# Patient Record
Sex: MALE | Race: WHITE | Employment: UNEMPLOYED | URBAN - NONMETROPOLITAN AREA
[De-identification: names, ages, dates, MRNs, and addresses within clinical notes are randomized per-mention and may not be internally consistent; named-entity substitution may affect disease eponyms.]

---

## 2017-08-24 ENCOUNTER — HOSPITAL ENCOUNTER (EMERGENCY)
Age: 52
Discharge: HOME | End: 2017-08-24
Attending: EMERGENCY MEDICINE
Payer: MEDICAID

## 2017-08-24 ENCOUNTER — APPOINTMENT (OUTPATIENT)
Dept: GENERAL RADIOLOGY | Age: 52
End: 2017-08-24
Payer: MEDICAID

## 2017-08-24 VITALS
TEMPERATURE: 96.8 F | HEART RATE: 105 BPM | HEIGHT: 70 IN | RESPIRATION RATE: 12 BRPM | DIASTOLIC BLOOD PRESSURE: 79 MMHG | SYSTOLIC BLOOD PRESSURE: 111 MMHG | BODY MASS INDEX: 31.5 KG/M2 | OXYGEN SATURATION: 97 % | WEIGHT: 220 LBS

## 2017-08-24 DIAGNOSIS — S90.31XA CONTUSION OF RIGHT FOOT, INITIAL ENCOUNTER: Primary | ICD-10-CM

## 2017-08-24 PROCEDURE — 99283 EMERGENCY DEPT VISIT LOW MDM: CPT

## 2017-08-24 PROCEDURE — 73630 X-RAY EXAM OF FOOT: CPT

## 2017-08-24 RX ORDER — IBUPROFEN 800 MG/1
800 TABLET ORAL EVERY 8 HOURS PRN
Qty: 30 TABLET | Refills: 0 | Status: SHIPPED | OUTPATIENT
Start: 2017-08-24

## 2017-08-24 RX ORDER — ALBUTEROL SULFATE 2.5 MG/3ML
2.5 SOLUTION RESPIRATORY (INHALATION) ONCE
Status: DISCONTINUED | OUTPATIENT
Start: 2017-08-24 | End: 2017-08-24

## 2017-08-24 RX ORDER — LOVASTATIN 10 MG/1
1 TABLET ORAL DAILY
COMMUNITY

## 2017-08-24 RX ORDER — LISINOPRIL 20 MG/1
20 TABLET ORAL DAILY
COMMUNITY

## 2017-08-24 RX ORDER — TRAZODONE HYDROCHLORIDE 150 MG/1
150 TABLET ORAL NIGHTLY
COMMUNITY

## 2017-08-24 ASSESSMENT — PAIN DESCRIPTION - LOCATION: LOCATION: FOOT

## 2017-08-24 ASSESSMENT — PAIN DESCRIPTION - ORIENTATION: ORIENTATION: RIGHT

## 2017-08-24 ASSESSMENT — PAIN DESCRIPTION - PAIN TYPE: TYPE: ACUTE PAIN

## 2017-08-24 ASSESSMENT — PAIN DESCRIPTION - DESCRIPTORS: DESCRIPTORS: ACHING;SHOOTING;JABBING

## 2017-08-24 ASSESSMENT — PAIN SCALES - GENERAL: PAINLEVEL_OUTOF10: 8

## 2017-10-17 ENCOUNTER — APPOINTMENT (OUTPATIENT)
Dept: GENERAL RADIOLOGY | Age: 52
End: 2017-10-17
Payer: MEDICAID

## 2017-10-17 ENCOUNTER — HOSPITAL ENCOUNTER (EMERGENCY)
Age: 52
Discharge: ANOTHER HOSPITAL | End: 2017-10-17
Attending: EMERGENCY MEDICINE
Payer: MEDICAID

## 2017-10-17 VITALS
HEIGHT: 70 IN | DIASTOLIC BLOOD PRESSURE: 79 MMHG | OXYGEN SATURATION: 99 % | HEART RATE: 94 BPM | TEMPERATURE: 96.8 F | WEIGHT: 220 LBS | SYSTOLIC BLOOD PRESSURE: 135 MMHG | BODY MASS INDEX: 31.5 KG/M2 | RESPIRATION RATE: 14 BRPM

## 2017-10-17 LAB
ABSOLUTE EOS #: 0.1 K/UL (ref 0–0.4)
ABSOLUTE LYMPH #: 2.6 K/UL (ref 1–4.8)
ABSOLUTE MONO #: 0.8 K/UL (ref 0.1–1.2)
ANION GAP SERPL CALCULATED.3IONS-SCNC: 14 MMOL/L (ref 9–17)
BASOPHILS # BLD: 1 % (ref 0–2)
BASOPHILS ABSOLUTE: 0.1 K/UL (ref 0–0.2)
BUN BLDV-MCNC: 13 MG/DL (ref 6–20)
BUN/CREAT BLD: 20 (ref 9–20)
CALCIUM SERPL-MCNC: 9.3 MG/DL (ref 8.6–10.4)
CHLORIDE BLD-SCNC: 96 MMOL/L (ref 98–107)
CO2: 26 MMOL/L (ref 20–31)
CREAT SERPL-MCNC: 0.65 MG/DL (ref 0.7–1.2)
DIFFERENTIAL TYPE: NORMAL
EOSINOPHILS RELATIVE PERCENT: 2 % (ref 1–8)
GFR AFRICAN AMERICAN: >60 ML/MIN
GFR NON-AFRICAN AMERICAN: >60 ML/MIN
GFR SERPL CREATININE-BSD FRML MDRD: ABNORMAL ML/MIN/{1.73_M2}
GFR SERPL CREATININE-BSD FRML MDRD: ABNORMAL ML/MIN/{1.73_M2}
GLUCOSE BLD-MCNC: 218 MG/DL (ref 70–99)
HCT VFR BLD CALC: 41.4 % (ref 41–53)
HEMOGLOBIN: 13.6 G/DL (ref 13.5–17.5)
LYMPHOCYTES # BLD: 33 % (ref 15–43)
MCH RBC QN AUTO: 30.3 PG (ref 26–34)
MCHC RBC AUTO-ENTMCNC: 32.9 G/DL (ref 31–37)
MCV RBC AUTO: 91.9 FL (ref 80–100)
MONOCYTES # BLD: 10 % (ref 6–14)
PDW BLD-RTO: 13.1 % (ref 11–14.5)
PLATELET # BLD: 288 K/UL (ref 140–450)
PLATELET ESTIMATE: NORMAL
PMV BLD AUTO: 7.1 FL (ref 6–12)
POTASSIUM SERPL-SCNC: 4.9 MMOL/L (ref 3.7–5.3)
RBC # BLD: 4.51 M/UL (ref 4.5–5.9)
RBC # BLD: NORMAL 10*6/UL
SEG NEUTROPHILS: 54 % (ref 44–74)
SEGMENTED NEUTROPHILS ABSOLUTE COUNT: 4.3 K/UL (ref 1.8–7.7)
SODIUM BLD-SCNC: 136 MMOL/L (ref 135–144)
WBC # BLD: 7.9 K/UL (ref 3.5–11)
WBC # BLD: NORMAL 10*3/UL

## 2017-10-17 PROCEDURE — 80048 BASIC METABOLIC PNL TOTAL CA: CPT

## 2017-10-17 PROCEDURE — 36415 COLL VENOUS BLD VENIPUNCTURE: CPT

## 2017-10-17 PROCEDURE — 85025 COMPLETE CBC W/AUTO DIFF WBC: CPT

## 2017-10-17 PROCEDURE — 73630 X-RAY EXAM OF FOOT: CPT

## 2017-10-17 PROCEDURE — 99284 EMERGENCY DEPT VISIT MOD MDM: CPT

## 2017-10-17 RX ORDER — SULFAMETHOXAZOLE AND TRIMETHOPRIM 800; 160 MG/1; MG/1
1 TABLET ORAL 2 TIMES DAILY
COMMUNITY
End: 2018-01-02 | Stop reason: ALTCHOICE

## 2017-10-17 RX ORDER — GABAPENTIN 400 MG/1
400 CAPSULE ORAL EVERY EVENING
COMMUNITY

## 2017-10-17 ASSESSMENT — PAIN DESCRIPTION - LOCATION: LOCATION: FOOT

## 2017-10-17 ASSESSMENT — PAIN DESCRIPTION - PAIN TYPE: TYPE: ACUTE PAIN

## 2017-10-17 ASSESSMENT — PAIN DESCRIPTION - FREQUENCY: FREQUENCY: CONTINUOUS

## 2017-10-17 ASSESSMENT — PAIN DESCRIPTION - DESCRIPTORS: DESCRIPTORS: SHARP;JABBING

## 2017-10-17 ASSESSMENT — PAIN DESCRIPTION - ORIENTATION: ORIENTATION: RIGHT

## 2017-10-17 ASSESSMENT — PAIN SCALES - GENERAL: PAINLEVEL_OUTOF10: 6

## 2017-10-17 NOTE — ED PROVIDER NOTES
eMERGENCY dEPARTMENT eNCOUnter      Pt Name: Meliza Cline  MRN: [id number]  [place] 1965  Date of evaluation: 10/17/2017      CHIEF COMPLAINT       Chief Complaint   Patient presents with    Wound Infection         HISTORY OF PRESENT ILLNESS    Meliza Cline is a 46 y.o. male who presents With wound infection. Patient states he had a amputation secondary to diabetic toe back in beginning of September. His last visit to his orthopedist was on September 21. He ended up with a wound dehiscence one week after surgery. He has not followed up since that time he was at [facility] 3 days ago. Was diagnosed with an infection started on antibiotics. He is here because of increasing pain throughout this time. Dark coloration and odor. No fever         REVIEW OF SYSTEMS       Positive pain swelling, redness, odor, right foot, negative for fever, chills     PAST MEDICAL HISTORY    has a past medical history of CAD (coronary artery disease); Depression; Diabetes mellitus ([address] Hyperlipidemia; and Hypertension. SURGICAL HISTORY      has a past surgical history that includes Spinal fusion and Toe amputation (Right, 09/04/2017). CURRENT MEDICATIONS       Previous Medications    GABAPENTIN (NEURONTIN) 400 MG CAPSULE    Take 400 mg by mouth 3 times daily    IBUPROFEN (ADVIL;MOTRIN) 800 MG TABLET    Take 1 tablet by mouth every 8 hours as needed for Pain    LISINOPRIL (PRINIVIL;ZESTRIL) 20 MG TABLET    Take 20 mg by mouth daily    LOVASTATIN PO    Take by mouth    METFORMIN HCL PO    Take by mouth    SULFAMETHOXAZOLE-TRIMETHOPRIM (BACTRIM DS;SEPTRA DS) 800-160 MG PER TABLET    Take 1 tablet by mouth 2 times daily    TRAZODONE (DESYREL) 150 MG TABLET    Take 150 mg by mouth nightly       ALLERGIES     is allergic to pcn [penicillins]. FAMILY HISTORY     has no family status information on file. family history is not on file.     SOCIAL HISTORY      reports that he has been smoking Cigarettes. He has been smoking about 1.00 pack per day. He does not have any smokeless tobacco history on file. He reports that he drinks alcohol. He reports that he uses drugs. PHYSICAL EXAM     INITIAL VITALS:  height is 5' 10\" (1.778 m) and weight is 220 lb (99.8 kg). His tympanic temperature is 96.8 °F (36 °C). His blood pressure is 135/79 and his pulse is 94. His respiration is 14 and oxygen saturation is 99%. Gen.: Patient is a middle-aged male in no apparent distress. Extremity: Examination right lower extremity reveals erythema, warmth. He has black hard tissue to the wound. File odor to the wound and some drainage    DIFFERENTIAL DIAGNOSIS/ MDM:     Postop wound infection, gangrene    DIAGNOSTIC RESULTS       RADIOLOGY:   I directly visualized the following  images and reviewed the radiologist interpretations:  XR FOOT RIGHT (MIN 3 VIEWS)   Preliminary Result   Interval 5th metatarsal trans amputation. Overlying soft tissue swelling   appears greater than expected given date of surgery. Underlying infection is   not excluded. Down definite erosive changes are seen to suggest osteomyelitis. LABS:  Labs Reviewed   BASIC METABOLIC PANEL - Abnormal; Notable for the following:        Result Value    Glucose 218 (*)     CREATININE 0.65 (*)     Chloride 96 (*)     All other components within normal limits   CBC WITH AUTO DIFFERENTIAL         EMERGENCY DEPARTMENT COURSE:   Vitals:    Vitals:    10/17/17 0546 10/17/17 0702   BP: (!) 168/76 135/79   Pulse: 105 94   Resp: 14 14   Temp: 96.8 °F (36 °C)    TempSrc: Tympanic    SpO2: 98% 99%   Weight: 220 lb (99.8 kg)    Height: 5' 10\" (1.778 m)      -------------------------  BP: 135/79, Temp: 96.8 °F (36 °C), Pulse: 94, Resp: 14    No orders of the defined types were placed in this encounter.           Re-evaluation Notes    I did contact his orthopedist who recommended him being admitted to [facility] under

## 2017-12-28 ENCOUNTER — TELEPHONE (OUTPATIENT)
Dept: WOUND CARE | Age: 52
End: 2017-12-28

## 2017-12-28 NOTE — TELEPHONE ENCOUNTER
1) Call to [facility], spoke with Kirstie Macias LPN, request for medical records. Kirstie Macias states that pt has no home address, and is homeless when not in living facility. Pt did have R foot amputation in September 2017 (see Care Everywhere-R 5th toe Ray amputation 9/4/17 Dr. John Villalobos). Kirstie Macias [facility] but [facility] is refusing services from [facility]. 2) Call to Dr. Reyes Pu office ([phone]),GUNDW with Ashly Griffiths, who states multiple attempts have been made to establish care relationship with pt, but appts have been cancelled by pt. Request for medical records; Ashly Griffiths states she will fax what they have, but is very limited.

## 2017-12-29 ENCOUNTER — TELEPHONE (OUTPATIENT)
Dept: WOUND CARE | Age: 52
End: 2017-12-29

## 2018-01-02 ENCOUNTER — OFFICE VISIT (OUTPATIENT)
Dept: WOUND CARE | Age: 53
End: 2018-01-02
Payer: MEDICAID

## 2018-01-02 VITALS
WEIGHT: 229 LBS | SYSTOLIC BLOOD PRESSURE: 108 MMHG | HEART RATE: 68 BPM | DIASTOLIC BLOOD PRESSURE: 74 MMHG | BODY MASS INDEX: 32.78 KG/M2 | HEIGHT: 70 IN | TEMPERATURE: 97.8 F

## 2018-01-02 DIAGNOSIS — E11.42 DM TYPE 2 WITH DIABETIC PERIPHERAL NEUROPATHY (HCC): ICD-10-CM

## 2018-01-02 DIAGNOSIS — Z89.431 STATUS POST PARTIAL AMPUTATION OF FOOT, RIGHT (HCC): ICD-10-CM

## 2018-01-02 DIAGNOSIS — L97.512 SKIN ULCER OF RIGHT FOOT WITH FAT LAYER EXPOSED (HCC): Primary | ICD-10-CM

## 2018-01-02 PROCEDURE — 99202 OFFICE O/P NEW SF 15 MIN: CPT | Performed by: PODIATRIST

## 2018-01-02 PROCEDURE — 11042 DBRDMT SUBQ TIS 1ST 20SQCM/<: CPT | Performed by: PODIATRIST

## 2018-01-02 RX ORDER — GLYBURIDE 2.5 MG/1
5 TABLET ORAL
COMMUNITY

## 2018-01-02 RX ORDER — CEFTRIAXONE 1 G/1
2000 INJECTION, POWDER, FOR SOLUTION INTRAMUSCULAR; INTRAVENOUS
COMMUNITY
Start: 2017-12-26 | End: 2018-01-05

## 2018-01-02 NOTE — PROGRESS NOTES
Vitals:   Vitals:    01/02/18 0911   BP: 108/74   Pulse: 68   Temp: 97.8 °F (36.6 °C)     General: AAO x 3 in NAD. Vascular: DP and PT pulses palpable 1/4, bilateral.  CFT <5 seconds, bilateral.  Hair growth absent to the level of the digits, bilateral.  Edema present, bilateral.  Varicosities present, bilateral. Erythema absent, bilateral. Distal Rubor absent bilateral.  Temperature decreased bilateral. Hyperpigmentation present bilateral. Atrophic skin yes. Neurological: Sensation Impaired to light touch to level of digits, bilateral.  Protective sensation intact  5/10 sites via 5.07/10g [place]-Diana Monofilament, bilateral.  negative Tinel's, bilateral.  negative Valleix sign, bilateral.  Vibratory abnormal  bilateral.  Reflexes Decreased bilateral.  Paresthesias positive. Dysthesias negative. Sharp/dull abnormal bilateral.    Musculoskeletal: Muscle strength 5/5, bilateral.  Pain absent upon palpation bilateral. Normal medial longitudinal arch, bilateral.  Ankle ROM within normal limits,bilateral.  1st MPJ ROM within normal limits, bilateral.  Dorsally contracted digits absent. Partial R 5th ray amp. No other foot deformities. Integument:   Open lesion present, Right. Ulcer lateral R foot from midfoot down to distal foot. Positive fibrin, healthy red granular tissue underlying, proximal point is deepest down to sub q tissue, no probing no malodor no signs of infx on edges. Interdigital maceration absent to web spaces , Bilateral.  Nails left none and right  none thickened, dystrophic and crumbly, discolored with subungual debris. Fissures absent, Bilateral. Hyperkeratotic tissue is absent.     Wound 01/02/18 #1 right lateral foot (Active)   Wound Type Wound 1/2/2018  9:28 AM   Wound Cleansed Rinsed/Irrigated with saline 1/2/2018  9:28 AM   Wound Length (cm) 9.2 cm 1/2/2018  9:28 AM   Wound Width (cm) 2.2 cm 1/2/2018  9:28 AM   Wound Depth (cm)  1.2 1/2/2018  9:28 AM   Calculated Wound infection. Follow-up at [facility] in 1 week(s).

## 2018-01-04 ENCOUNTER — TELEPHONE (OUTPATIENT)
Dept: WOUND CARE | Age: 53
End: 2018-01-04

## 2018-01-08 ENCOUNTER — OFFICE VISIT (OUTPATIENT)
Dept: WOUND CARE | Age: 53
End: 2018-01-08
Payer: MEDICAID

## 2018-01-08 VITALS
HEIGHT: 70 IN | BODY MASS INDEX: 33.84 KG/M2 | RESPIRATION RATE: 20 BRPM | DIASTOLIC BLOOD PRESSURE: 70 MMHG | TEMPERATURE: 97.4 F | HEART RATE: 80 BPM | WEIGHT: 236.4 LBS | SYSTOLIC BLOOD PRESSURE: 122 MMHG

## 2018-01-08 DIAGNOSIS — L97.512 SKIN ULCER OF RIGHT FOOT WITH FAT LAYER EXPOSED (HCC): Primary | ICD-10-CM

## 2018-01-08 DIAGNOSIS — E11.42 DM TYPE 2 WITH DIABETIC PERIPHERAL NEUROPATHY (HCC): ICD-10-CM

## 2018-01-08 DIAGNOSIS — Z89.431 STATUS POST PARTIAL AMPUTATION OF FOOT, RIGHT (HCC): ICD-10-CM

## 2018-01-08 PROCEDURE — 97597 DBRDMT OPN WND 1ST 20 CM/<: CPT | Performed by: PODIATRIST

## 2018-01-08 NOTE — PROGRESS NOTES
Subjective:    Gretchen Bhatt is a 46 y.o. male who presents with the ulceration of the foot. Patient has been compliant with off loading. Patient relates pain is Absent . Currently denies F/C/N/V. Overall diabetic control is not changed. Allergies   Allergen Reactions    Pcn [Penicillins] Rash       Past Medical History:   Diagnosis Date    CAD (coronary artery disease)     Depression     Diabetes mellitus ([address]     Hyperlipidemia     Hypertension        Prior to Admission medications    Medication Sig Start Date End Date Taking? Authorizing Provider   glyBURIDE (DIABETA) 2.5 MG tablet Take 2.5 mg by mouth   Yes Historical Provider, MD   gabapentin (NEURONTIN) 400 MG capsule Take 400 mg by mouth every evening . Yes Historical Provider, MD   lovastatin (MEVACOR) 10 MG tablet Take 1 tablet by mouth daily    Yes Historical Provider, MD   traZODone (DESYREL) 150 MG tablet Take 150 mg by mouth nightly   Yes Historical Provider, MD   lisinopril (PRINIVIL;ZESTRIL) 20 MG tablet Take 20 mg by mouth daily   Yes Historical Provider, MD   metFORMIN (GLUCOPHAGE) 500 MG tablet Take 1 tablet by mouth 2 times daily    Yes Historical Provider, MD   ibuprofen (ADVIL;MOTRIN) 800 MG tablet Take 1 tablet by mouth every 8 hours as needed for Pain 8/24/17  Yes Mendoza Weller MD       Social History   Substance Use Topics    Smoking status: Current Every Day Smoker     Packs/day: 1.00     Types: Cigarettes    Smokeless tobacco: Never Used    Alcohol use Yes      Comment: rarely       Review of Systems: All 12 systems reviewed and pertinent positives noted above. Lower Extremity Physical Examination:     Vitals:   Vitals:    01/08/18 1003   BP: 122/70   Pulse: 80   Resp: 20   Temp: 97.4 °F (36.3 °C)     General: AAO x 3 in NAD.      Vascular: DP and PT pulses palpable 1/4, bilateral.  CFT <5 seconds, bilateral.  Hair growth absent to the level of the digits, bilateral.  Edema present, bilateral.  Varicosities present,

## 2018-01-11 ENCOUNTER — TELEPHONE (OUTPATIENT)
Dept: INFECTIOUS DISEASES | Age: 53
End: 2018-01-11

## 2018-01-11 ENCOUNTER — OFFICE VISIT (OUTPATIENT)
Dept: INFECTIOUS DISEASES | Age: 53
End: 2018-01-11
Payer: MEDICAID

## 2018-01-11 VITALS
HEIGHT: 70 IN | DIASTOLIC BLOOD PRESSURE: 71 MMHG | TEMPERATURE: 97 F | WEIGHT: 234 LBS | OXYGEN SATURATION: 94 % | SYSTOLIC BLOOD PRESSURE: 121 MMHG | BODY MASS INDEX: 33.5 KG/M2 | RESPIRATION RATE: 14 BRPM | HEART RATE: 99 BPM

## 2018-01-11 DIAGNOSIS — I73.9 PVD (PERIPHERAL VASCULAR DISEASE) (HCC): Primary | ICD-10-CM

## 2018-01-11 DIAGNOSIS — M86.171 OTHER ACUTE OSTEOMYELITIS OF RIGHT FOOT (HCC): ICD-10-CM

## 2018-01-11 PROBLEM — M86.9 OSTEOMYELITIS OF RIGHT FOOT (HCC): Status: ACTIVE | Noted: 2018-01-11

## 2018-01-11 PROCEDURE — G8484 FLU IMMUNIZE NO ADMIN: HCPCS | Performed by: INTERNAL MEDICINE

## 2018-01-11 PROCEDURE — G8427 DOCREV CUR MEDS BY ELIG CLIN: HCPCS | Performed by: INTERNAL MEDICINE

## 2018-01-11 PROCEDURE — 4004F PT TOBACCO SCREEN RCVD TLK: CPT | Performed by: INTERNAL MEDICINE

## 2018-01-11 PROCEDURE — G8417 CALC BMI ABV UP PARAM F/U: HCPCS | Performed by: INTERNAL MEDICINE

## 2018-01-11 PROCEDURE — 3017F COLORECTAL CA SCREEN DOC REV: CPT | Performed by: INTERNAL MEDICINE

## 2018-01-11 PROCEDURE — 99213 OFFICE O/P EST LOW 20 MIN: CPT | Performed by: INTERNAL MEDICINE

## 2018-01-11 RX ORDER — FLUTICASONE PROPIONATE 50 MCG
1 SPRAY, SUSPENSION (ML) NASAL
COMMUNITY

## 2018-01-11 RX ORDER — ACETAMINOPHEN 325 MG/1
650 TABLET ORAL EVERY 6 HOURS PRN
COMMUNITY

## 2018-01-11 RX ORDER — CEFTRIAXONE 2 G/1
2 INJECTION, POWDER, FOR SOLUTION INTRAMUSCULAR; INTRAVENOUS EVERY 24 HOURS
COMMUNITY
End: 2018-04-09 | Stop reason: ALTCHOICE

## 2018-01-12 NOTE — TELEPHONE ENCOUNTER
I called [facility] and spoke with Mariaelena Jose. I asked her to fax lab results to our office. She said she will do this.

## 2018-01-15 ENCOUNTER — OFFICE VISIT (OUTPATIENT)
Dept: WOUND CARE | Age: 53
End: 2018-01-15
Payer: MEDICAID

## 2018-01-15 VITALS — HEART RATE: 80 BPM | SYSTOLIC BLOOD PRESSURE: 122 MMHG | TEMPERATURE: 97.5 F | DIASTOLIC BLOOD PRESSURE: 74 MMHG

## 2018-01-15 DIAGNOSIS — Z89.431 STATUS POST PARTIAL AMPUTATION OF FOOT, RIGHT (HCC): ICD-10-CM

## 2018-01-15 DIAGNOSIS — L97.512 SKIN ULCER OF RIGHT FOOT WITH FAT LAYER EXPOSED (HCC): Primary | ICD-10-CM

## 2018-01-15 DIAGNOSIS — E11.42 DM TYPE 2 WITH DIABETIC PERIPHERAL NEUROPATHY (HCC): ICD-10-CM

## 2018-01-15 PROCEDURE — 11042 DBRDMT SUBQ TIS 1ST 20SQCM/<: CPT | Performed by: PODIATRIST

## 2018-01-15 NOTE — PROGRESS NOTES
Examination:     Vitals:   Vitals:    01/15/18 0914   BP: 122/74   Pulse: 80   Temp: 97.5 °F (36.4 °C)     General: AAO x 3 in NAD. Vascular: DP and PT pulses palpable 1/4, bilateral.  CFT <5 seconds, bilateral.  Hair growth absent to the level of the digits, bilateral.  Edema present, bilateral.  Varicosities present, bilateral. Erythema absent, bilateral. Distal Rubor absent bilateral.  Temperature decreased bilateral. Hyperpigmentation present bilateral. Atrophic skin yes. Neurological: Sensation Impaired to light touch to level of digits, bilateral.  Protective sensation intact  5/10 sites via 5.07/10g [place]-Diana Monofilament, bilateral.  negative Tinel's, bilateral.  negative Valleix sign, bilateral.  Vibratory abnormal  bilateral.  Reflexes Decreased bilateral.  Paresthesias positive. Dysthesias negative. Sharp/dull abnormal bilateral.    Musculoskeletal: Muscle strength 5/5, bilateral.  Pain absent upon palpation bilateral. Normal medial longitudinal arch, bilateral.  Ankle ROM within normal limits,bilateral.  1st MPJ ROM within normal limits, bilateral.  Dorsally contracted digits absent. Partial R 5th ray amp. No other foot deformities. Integument:   Open lesion present, Right. Ulcer lateral R foot from midfoot down to distal foot. Positive fibrin, healthy red granular tissue underlying, proximal point into sub q tissue. no probing no malodor no signs of infx on edges. Interdigital maceration absent to web spaces , Bilateral.  Nails left none and right  none thickened, dystrophic and crumbly, discolored with subungual debris. Fissures absent, Bilateral. Hyperkeratotic tissue is absent.   Wound 01/02/18 #1 right lateral foot (Active)   Wound Type Wound 1/2/2018  9:28 AM   Dressing/Treatment Alginate 1/15/2018  9:17 AM   Wound Cleansed Rinsed/Irrigated with saline 1/15/2018  9:17 AM   Dressing Change Due 01/03/18 1/2/2018  9:52 AM   Wound Length (cm) 8.8 cm 1/15/2018  9:17 AM   Wound

## 2018-01-15 NOTE — PATIENT INSTRUCTIONS
Continue silver alginate and dry dressing to right foot wound. Change daily. OK to cleanse with mild soap & water, normal saline, or wound wash. Follow up with Dr. Brenda Luo in 1 week. Call clinic for any questions or concerns. SIGNS OF INFECTION  - Redness, swelling, skin hot  - Wound bed turns black or stringy yellow  - Foul odor  - Increased drainage or pus  - Increased pain  - Fever greater than 100F    CALL YOUR DOCTOR OR SEEK MEDICAL ATTENTION IF SIGNS OF INFECTION.   DO NOT WAIT UNTIL YOUR NEXT APPOINTMENT    Call the Wound Care Nurse with any other questions or concerns- [phone]

## 2018-01-17 ENCOUNTER — TELEPHONE (OUTPATIENT)
Dept: WOUND CARE | Age: 53
End: 2018-01-17

## 2018-01-22 ENCOUNTER — OFFICE VISIT (OUTPATIENT)
Dept: WOUND CARE | Age: 53
End: 2018-01-22
Payer: MEDICAID

## 2018-01-22 ENCOUNTER — TELEPHONE (OUTPATIENT)
Dept: WOUND CARE | Age: 53
End: 2018-01-22

## 2018-01-22 VITALS
HEIGHT: 70 IN | BODY MASS INDEX: 33.93 KG/M2 | SYSTOLIC BLOOD PRESSURE: 118 MMHG | TEMPERATURE: 97.8 F | HEART RATE: 86 BPM | DIASTOLIC BLOOD PRESSURE: 78 MMHG | WEIGHT: 237 LBS

## 2018-01-22 DIAGNOSIS — L97.512 SKIN ULCER OF RIGHT FOOT WITH FAT LAYER EXPOSED (HCC): Primary | ICD-10-CM

## 2018-01-22 DIAGNOSIS — E11.42 DM TYPE 2 WITH DIABETIC PERIPHERAL NEUROPATHY (HCC): ICD-10-CM

## 2018-01-22 DIAGNOSIS — Z89.431 STATUS POST PARTIAL AMPUTATION OF FOOT, RIGHT (HCC): ICD-10-CM

## 2018-01-22 DIAGNOSIS — M79.671 RIGHT FOOT PAIN: ICD-10-CM

## 2018-01-22 PROCEDURE — 3017F COLORECTAL CA SCREEN DOC REV: CPT | Performed by: PODIATRIST

## 2018-01-22 PROCEDURE — G8484 FLU IMMUNIZE NO ADMIN: HCPCS | Performed by: PODIATRIST

## 2018-01-22 PROCEDURE — 97597 DBRDMT OPN WND 1ST 20 CM/<: CPT | Performed by: PODIATRIST

## 2018-01-22 PROCEDURE — 4004F PT TOBACCO SCREEN RCVD TLK: CPT | Performed by: PODIATRIST

## 2018-01-22 PROCEDURE — 15275 SKIN SUB GRAFT FACE/NK/HF/G: CPT | Performed by: PODIATRIST

## 2018-01-22 PROCEDURE — 3046F HEMOGLOBIN A1C LEVEL >9.0%: CPT | Performed by: PODIATRIST

## 2018-01-22 PROCEDURE — G8417 CALC BMI ABV UP PARAM F/U: HCPCS | Performed by: PODIATRIST

## 2018-01-22 PROCEDURE — G8427 DOCREV CUR MEDS BY ELIG CLIN: HCPCS | Performed by: PODIATRIST

## 2018-01-22 RX ORDER — BUPROPION HYDROCHLORIDE 150 MG/1
150 TABLET, EXTENDED RELEASE ORAL 2 TIMES DAILY
COMMUNITY

## 2018-01-22 RX ORDER — HYDROCODONE BITARTRATE AND ACETAMINOPHEN 5; 325 MG/1; MG/1
1 TABLET ORAL EVERY 6 HOURS PRN
Qty: 8 TABLET | Refills: 0 | Status: SHIPPED | OUTPATIENT
Start: 2018-01-22 | End: 2018-01-24

## 2018-01-22 NOTE — PROGRESS NOTES
Apligraf #1 placed today; Lot #[id number], unit #36, expiration 1/27/18.
exact measurements, if not documented then size was less than 20 sq cm. Patient presents today for the first Apligraf application. Sharp excisional debridement of the ulcer took place for preparation of the wound bed. Hemostasis was achieved. Apligraf was checked and good pH. Apligraf was prepped and placed on the ulceration. 75% was used and 25% wasted. Please see chart for exact ulcer measurements. Apligraf was then secured in place using wound veil and steri strips. Dressing took place with mineral oil gauze, dry gauze, asaf, and coban. Patient will continue offloading as advised. Patient will follow up in 1 week. Orders Placed This Encounter   Medications    HYDROcodone-acetaminophen (NORCO) 5-325 MG per tablet     Sig: Take 1 tablet by mouth every 6 hours as needed for Pain for up to 2 days. Dispense:  8 tablet     Refill:  0   continue offloading  Contact clinic with any questions/problems/concerns or new signs of infection. Follow-up at [facility] in 1 week(s).

## 2018-01-22 NOTE — LETTER
[facility]  [facility]  Phone: [phone]  Fax: [phone]    Timothy De La Cruz DPM        January 22, 2018     Patient: Aparna Villa   YOB: 1965   Date of Visit: 1/22/2018       To Whom It May Concern: It is my medical opinion that Tonio Hsieh should remain out of work until 2/28/18. If you have any questions or concerns, please don't hesitate to call.     Sincerely,        Timothy De La Cruz DPM

## 2018-01-29 ENCOUNTER — OFFICE VISIT (OUTPATIENT)
Dept: WOUND CARE | Age: 53
End: 2018-01-29
Payer: MEDICAID

## 2018-01-29 VITALS
HEART RATE: 88 BPM | TEMPERATURE: 97.2 F | WEIGHT: 237 LBS | DIASTOLIC BLOOD PRESSURE: 70 MMHG | BODY MASS INDEX: 33.93 KG/M2 | HEIGHT: 70 IN | SYSTOLIC BLOOD PRESSURE: 126 MMHG

## 2018-01-29 DIAGNOSIS — Z89.431 STATUS POST PARTIAL AMPUTATION OF FOOT, RIGHT (HCC): ICD-10-CM

## 2018-01-29 DIAGNOSIS — E11.42 DM TYPE 2 WITH DIABETIC PERIPHERAL NEUROPATHY (HCC): ICD-10-CM

## 2018-01-29 DIAGNOSIS — L97.512 SKIN ULCER OF RIGHT FOOT WITH FAT LAYER EXPOSED (HCC): Primary | ICD-10-CM

## 2018-01-29 PROCEDURE — 15275 SKIN SUB GRAFT FACE/NK/HF/G: CPT | Performed by: PODIATRIST

## 2018-01-29 NOTE — PATIENT INSTRUCTIONS
Standard apligraf dressing of wound veil, steristrips, mineral oil gauze, dry gauze, roll gauze and coban applied. Do not change dressing. It will be changed at your next appointment. Apligraf may have a foul odor, and does not mean that your wound is infected. SIGNS OF INFECTION  - Redness, swelling, skin hot  - Wound bed turns black or stringy yellow  - Foul odor  - Increased drainage or pus  - Increased pain  - Fever greater than 100F    CALL YOUR DOCTOR OR SEEK MEDICAL ATTENTION IF SIGNS OF INFECTION.   DO NOT WAIT UNTIL YOUR NEXT APPOINTMENT    Call the Wound Care Nurse with any other questions or concerns- [phone]

## 2018-01-31 ENCOUNTER — TELEPHONE (OUTPATIENT)
Dept: WOUND CARE | Age: 53
End: 2018-01-31

## 2018-02-05 ENCOUNTER — OFFICE VISIT (OUTPATIENT)
Dept: WOUND CARE | Age: 53
End: 2018-02-05
Payer: MEDICAID

## 2018-02-05 ENCOUNTER — HOSPITAL ENCOUNTER (OUTPATIENT)
Age: 53
Setting detail: SPECIMEN
Discharge: HOME | End: 2018-02-05
Payer: MEDICAID

## 2018-02-05 ENCOUNTER — HOSPITAL ENCOUNTER (OUTPATIENT)
Dept: GENERAL RADIOLOGY | Age: 53
Discharge: HOME | End: 2018-02-07
Payer: MEDICAID

## 2018-02-05 ENCOUNTER — TELEPHONE (OUTPATIENT)
Dept: WOUND CARE | Age: 53
End: 2018-02-05

## 2018-02-05 VITALS — HEART RATE: 84 BPM | DIASTOLIC BLOOD PRESSURE: 66 MMHG | SYSTOLIC BLOOD PRESSURE: 120 MMHG | TEMPERATURE: 98.2 F

## 2018-02-05 DIAGNOSIS — Z89.431 STATUS POST PARTIAL AMPUTATION OF FOOT, RIGHT (HCC): ICD-10-CM

## 2018-02-05 DIAGNOSIS — L97.512 SKIN ULCER OF RIGHT FOOT WITH FAT LAYER EXPOSED (HCC): ICD-10-CM

## 2018-02-05 DIAGNOSIS — M79.671 FOOT PAIN, RIGHT: ICD-10-CM

## 2018-02-05 DIAGNOSIS — E11.42 DM TYPE 2 WITH DIABETIC PERIPHERAL NEUROPATHY (HCC): ICD-10-CM

## 2018-02-05 DIAGNOSIS — M79.671 FOOT PAIN, RIGHT: Primary | ICD-10-CM

## 2018-02-05 LAB
INR BLD: 1.2
PROTHROMBIN TIME: 12.5 SEC (ref 9.4–11.3)

## 2018-02-05 PROCEDURE — 3046F HEMOGLOBIN A1C LEVEL >9.0%: CPT | Performed by: PODIATRIST

## 2018-02-05 PROCEDURE — G8484 FLU IMMUNIZE NO ADMIN: HCPCS | Performed by: PODIATRIST

## 2018-02-05 PROCEDURE — 73630 X-RAY EXAM OF FOOT: CPT

## 2018-02-05 PROCEDURE — 15275 SKIN SUB GRAFT FACE/NK/HF/G: CPT | Performed by: PODIATRIST

## 2018-02-05 PROCEDURE — 4004F PT TOBACCO SCREEN RCVD TLK: CPT | Performed by: PODIATRIST

## 2018-02-05 PROCEDURE — 3017F COLORECTAL CA SCREEN DOC REV: CPT | Performed by: PODIATRIST

## 2018-02-05 PROCEDURE — 85610 PROTHROMBIN TIME: CPT

## 2018-02-05 PROCEDURE — 36415 COLL VENOUS BLD VENIPUNCTURE: CPT

## 2018-02-05 PROCEDURE — G8417 CALC BMI ABV UP PARAM F/U: HCPCS | Performed by: PODIATRIST

## 2018-02-05 PROCEDURE — 99213 OFFICE O/P EST LOW 20 MIN: CPT | Performed by: PODIATRIST

## 2018-02-05 PROCEDURE — G8427 DOCREV CUR MEDS BY ELIG CLIN: HCPCS | Performed by: PODIATRIST

## 2018-02-05 RX ORDER — WARFARIN SODIUM 5 MG/1
5 TABLET ORAL
COMMUNITY
Start: 2018-02-03

## 2018-02-05 RX ORDER — DOCUSATE SODIUM 100 MG/1
100 CAPSULE, LIQUID FILLED ORAL 2 TIMES DAILY
COMMUNITY

## 2018-02-05 NOTE — PROGRESS NOTES
Pt reports he was in hospital ([facility]) for pulmonary embolism 2/1-2/3/18. Medication list updated.

## 2018-02-05 NOTE — PROGRESS NOTES
mg by mouth daily    Historical Provider, MD   metFORMIN (GLUCOPHAGE) 500 MG tablet Take 1 tablet by mouth 2 times daily     Historical Provider, MD   ibuprofen (ADVIL;MOTRIN) 800 MG tablet Take 1 tablet by mouth every 8 hours as needed for Pain 8/24/17   Maribeth Huynh MD       Social History   Substance Use Topics    Smoking status: Current Every Day Smoker     Packs/day: 1.00     Types: Cigarettes    Smokeless tobacco: Never Used    Alcohol use Yes      Comment: rarely       Review of Systems: All 12 systems reviewed and pertinent positives noted above. Lower Extremity Physical Examination:     Vitals:   Vitals:    02/05/18 0937   BP: 120/66   Pulse: 84   Temp: 98.2 °F (36.8 °C)     General: AAO x 3 in NAD. Vascular: DP and PT pulses palpable 1/4, bilateral.  CFT <5 seconds, bilateral.  Hair growth absent to the level of the digits, bilateral.  Edema present, bilateral.  Varicosities present, bilateral. Erythema absent, bilateral. Distal Rubor absent bilateral.  Temperature decreased bilateral. Hyperpigmentation present bilateral. Atrophic skin yes. Neurological: Sensation Impaired to light touch to level of digits, bilateral.  Protective sensation intact  5/10 sites via 5.07/10g [place]-Diana Monofilament, bilateral.  negative Tinel's, bilateral.  negative Valleix sign, bilateral.  Vibratory abnormal  bilateral.  Reflexes Decreased bilateral.  Paresthesias positive. Dysthesias negative. Sharp/dull abnormal bilateral.    Musculoskeletal: Muscle strength 5/5, bilateral.  Pain present upon palpation R sub 4th met head and 3rd interspace, no edema or erythema there at all. No palapble mass felt. . Normal medial longitudinal arch, bilateral.  Ankle ROM within normal limits,bilateral.  1st MPJ ROM within normal limits, bilateral.  Dorsally contracted digits absent. Partial R 5th ray amp. No other foot deformities. Integument:   Open lesion present, Right.   Ulcer lateral R foot from midfoot down to distal foot. Positive fibrin, new epithelial skinseen again, healthy red granular tissue underlying, proximal point into sub q tissue. no probing no malodor no signs of infx on edges. Interdigital maceration absent to web spaces , Bilateral.  Nails left none and right  none thickened, dystrophic and crumbly, discolored with subungual debris. Fissures absent, Bilateral. Hyperkeratotic tissue is absent. Wound 01/02/18 #1 right lateral foot (Active)   Wound Type Wound 1/2/2018  9:28 AM   Dressing/Treatment Alginate 1/22/2018  9:40 AM   Wound Cleansed Rinsed/Irrigated with saline 1/22/2018  9:40 AM   Dressing Change Due 01/03/18 1/2/2018  9:52 AM   Wound Length (cm) 8 cm 2/5/2018  9:32 AM   Wound Width (cm) 1.1 cm 2/5/2018  9:32 AM   Wound Depth (cm)  0.4 2/5/2018  9:32 AM   Calculated Wound Size (cm^2) (l*w) 8.8 cm^2 2/5/2018  9:32 AM   Change in Wound Size % (l*w) 56.52 2/5/2018  9:32 AM   Wound Assessment Fibrin;Pink 2/5/2018  9:32 AM   Drainage Amount Moderate 2/5/2018  9:32 AM   Drainage Description Tan 1/29/2018  9:40 AM   Odor None 2/5/2018  9:32 AM   Margins Defined edges 2/5/2018  9:32 AM   Rosalind-wound Assessment Dry;Clean 2/5/2018  9:32 AM   Non-staged Wound Description Full thickness 1/2/2018  9:52 AM   [place]%Wound Bed 95 1/22/2018  9:40 AM   Red%Wound Bed 95 1/15/2018  9:17 AM   Yellow%Wound Bed 5 1/22/2018  9:40 AM   Number of days: 34             Asessment: Patient is a 46 y.o. male with:   1. Foot pain, right  XR FOOT RIGHT (MIN 3 VIEWS)   2. Skin ulcer of right foot with fat layer exposed ([address]  ND APLIGRAF    ND SUB GRFT F/S/N/H/F/G/M/D /<100SCM /<1ST 25 SCM   3. Status post partial amputation of foot, right (HCC)  ND APLIGRAF    ND SUB GRFT F/S/N/H/F/G/M/D /<100SCM /<1ST 25 SCM   4. DM type 2 with diabetic peripheral neuropathy (HCC)  ND APLIGRAF    ND SUB GRFT F/S/N/H/F/G/M/D /<100SCM /<1ST 25 SCM     Ulcer Classification:  Diabetic ulcer grade 2 C. IDSA  no infection.       Plan:  Patient presents

## 2018-02-05 NOTE — PATIENT INSTRUCTIONS
Apligraf #3 placed today. Nursing staff may replace outer portion of dressing in event of strike-though drainage: calcium alginate and ultra-absorbent dressing (i.e. Sorbion, Mextra, etc), roll gauze. Leave steristripped portion of dressing intact. Continue NWB to right foot. Follow up with Dr. Ann Martines in 1 week. Call clinic for any questions or concerns. SIGNS OF INFECTION  - Redness, swelling, skin hot  - Wound bed turns black or stringy yellow  - Foul odor  - Increased drainage or pus  - Increased pain  - Fever greater than 100F    CALL YOUR DOCTOR OR SEEK MEDICAL ATTENTION IF SIGNS OF INFECTION.   DO NOT WAIT UNTIL YOUR NEXT APPOINTMENT    Call the Wound Care Nurse with any other questions or concerns- [phone]

## 2018-02-06 ENCOUNTER — TELEPHONE (OUTPATIENT)
Dept: WOUND CARE | Age: 53
End: 2018-02-06

## 2018-02-06 NOTE — TELEPHONE ENCOUNTER
2/6/2018 Facundo Jacques, patient's nurse, called from the [facility], stating patient is very concerned that he is going to lose his foot. She states he wants something done ASAP. Caller states patient had an xray yesterday and was concerned about those results.   Please call her back [phone]

## 2018-02-06 NOTE — TELEPHONE ENCOUNTER
Spoke with Rylan Huynh at [facility] and per  the patients xrays were the same as his previous xrays nad he will review xrays with patient as his next scheduled appointment. Rylan Huynh verbalized understanding and will relay the information on to the patient.

## 2018-02-08 ENCOUNTER — HOSPITAL ENCOUNTER (OUTPATIENT)
Age: 53
Setting detail: SPECIMEN
Discharge: HOME | End: 2018-02-08
Payer: MEDICAID

## 2018-02-08 LAB
INR BLD: 1.9
PROTHROMBIN TIME: 20.3 SEC (ref 9.4–11.3)

## 2018-02-08 PROCEDURE — 85610 PROTHROMBIN TIME: CPT

## 2018-02-08 PROCEDURE — 36415 COLL VENOUS BLD VENIPUNCTURE: CPT

## 2018-02-12 ENCOUNTER — OFFICE VISIT (OUTPATIENT)
Dept: WOUND CARE | Age: 53
End: 2018-02-12
Payer: MEDICAID

## 2018-02-12 ENCOUNTER — HOSPITAL ENCOUNTER (OUTPATIENT)
Age: 53
Setting detail: SPECIMEN
Discharge: HOME | End: 2018-02-12
Payer: MEDICAID

## 2018-02-12 VITALS
HEART RATE: 80 BPM | DIASTOLIC BLOOD PRESSURE: 76 MMHG | TEMPERATURE: 98.6 F | SYSTOLIC BLOOD PRESSURE: 128 MMHG | HEIGHT: 70 IN | RESPIRATION RATE: 18 BRPM | WEIGHT: 237.8 LBS | BODY MASS INDEX: 34.04 KG/M2

## 2018-02-12 DIAGNOSIS — E11.42 DM TYPE 2 WITH DIABETIC PERIPHERAL NEUROPATHY (HCC): ICD-10-CM

## 2018-02-12 DIAGNOSIS — Z89.431 STATUS POST PARTIAL AMPUTATION OF FOOT, RIGHT (HCC): ICD-10-CM

## 2018-02-12 DIAGNOSIS — L97.512 SKIN ULCER OF RIGHT FOOT WITH FAT LAYER EXPOSED (HCC): Primary | ICD-10-CM

## 2018-02-12 DIAGNOSIS — L97.512 SKIN ULCER OF RIGHT FOOT WITH FAT LAYER EXPOSED (HCC): ICD-10-CM

## 2018-02-12 DIAGNOSIS — M79.671 RIGHT FOOT PAIN: ICD-10-CM

## 2018-02-12 PROCEDURE — G8484 FLU IMMUNIZE NO ADMIN: HCPCS | Performed by: PODIATRIST

## 2018-02-12 PROCEDURE — 87075 CULTR BACTERIA EXCEPT BLOOD: CPT

## 2018-02-12 PROCEDURE — 3017F COLORECTAL CA SCREEN DOC REV: CPT | Performed by: PODIATRIST

## 2018-02-12 PROCEDURE — 87205 SMEAR GRAM STAIN: CPT

## 2018-02-12 PROCEDURE — 97597 DBRDMT OPN WND 1ST 20 CM/<: CPT | Performed by: PODIATRIST

## 2018-02-12 PROCEDURE — 3046F HEMOGLOBIN A1C LEVEL >9.0%: CPT | Performed by: PODIATRIST

## 2018-02-12 PROCEDURE — 87076 CULTURE ANAEROBE IDENT EACH: CPT

## 2018-02-12 PROCEDURE — 87070 CULTURE OTHR SPECIMN AEROBIC: CPT

## 2018-02-12 PROCEDURE — G8427 DOCREV CUR MEDS BY ELIG CLIN: HCPCS | Performed by: PODIATRIST

## 2018-02-12 PROCEDURE — G8417 CALC BMI ABV UP PARAM F/U: HCPCS | Performed by: PODIATRIST

## 2018-02-12 PROCEDURE — 87176 TISSUE HOMOGENIZATION CULTR: CPT

## 2018-02-12 PROCEDURE — 86403 PARTICLE AGGLUT ANTBDY SCRN: CPT

## 2018-02-12 PROCEDURE — 4004F PT TOBACCO SCREEN RCVD TLK: CPT | Performed by: PODIATRIST

## 2018-02-12 PROCEDURE — 87186 SC STD MICRODIL/AGAR DIL: CPT

## 2018-02-12 RX ORDER — CIPROFLOXACIN 500 MG/1
500 TABLET, FILM COATED ORAL 2 TIMES DAILY
Qty: 20 TABLET | Refills: 0 | Status: SHIPPED | OUTPATIENT
Start: 2018-02-12 | End: 2018-02-22

## 2018-02-12 RX ORDER — HYDROCODONE BITARTRATE AND ACETAMINOPHEN 5; 325 MG/1; MG/1
1 TABLET ORAL EVERY 6 HOURS PRN
Qty: 28 TABLET | Refills: 0 | Status: SHIPPED | OUTPATIENT
Start: 2018-02-12 | End: 2018-02-19 | Stop reason: SDUPTHER

## 2018-02-12 RX ORDER — CLINDAMYCIN HYDROCHLORIDE 300 MG/1
300 CAPSULE ORAL 3 TIMES DAILY
Qty: 30 CAPSULE | Refills: 0 | Status: SHIPPED | OUTPATIENT
Start: 2018-02-12 | End: 2018-02-22

## 2018-02-12 NOTE — PATIENT INSTRUCTIONS
Start Dakins' moist to dry dressing to right foot wound; change 2x/day and as needed for drainage, soiling, or dislodged. Limit weight bearing to R foot. Reduce smoking. Start antibiotics today, take as prescribed. Follow up in 1 week with Dr. Rachell Mcallister. Call clinic for any questions or concerns. SIGNS OF INFECTION  - Redness, swelling, skin hot  - Wound bed turns black or stringy yellow  - Foul odor  - Increased drainage or pus  - Increased pain  - Fever greater than 100F    CALL YOUR DOCTOR OR SEEK MEDICAL ATTENTION IF SIGNS OF INFECTION.   DO NOT WAIT UNTIL YOUR NEXT APPOINTMENT    Call the Wound Care Nurse with any other questions or concerns- [phone]

## 2018-02-12 NOTE — PROGRESS NOTES
Subjective:    Mann Oliveros is a 46 y.o. male who presents with the increasing pain R foot. Pt noticed a little more redness. pt has been wearing sx shoe. Pt is at home now out of the nursing home. Patient has been compliant with off loading. Patient relates pain is Absent . Currently denies F/C/N/V. Overall diabetic control is not changed. Allergies   Allergen Reactions    Pcn [Penicillins] Rash       Past Medical History:   Diagnosis Date    CAD (coronary artery disease)     Depression     Diabetes mellitus ([facility] [address]     Hyperlipidemia     Hypertension        Prior to Admission medications    Medication Sig Start Date End Date Taking? Authorizing Provider   ciprofloxacin (CIPRO) 500 MG tablet Take 1 tablet by mouth 2 times daily for 10 days 2/12/18 2/22/18 Yes Arsalan Bateman DPM   clindamycin (CLEOCIN) 300 MG capsule Take 1 capsule by mouth 3 times daily for 10 days 2/12/18 2/22/18 Yes Alicia Loza DPM   HYDROcodone-acetaminophen (NORCO) 5-325 MG per tablet Take 1 tablet by mouth every 6 hours as needed for Pain for up to 7 days.  2/12/18 2/19/18 Yes Alicia Loza DPM   warfarin (COUMADIN) 5 MG tablet Take 5 mg by mouth 2/3/18   Historical Provider, MD   docusate sodium (COLACE) 100 MG capsule Take 100 mg by mouth 2 times daily    Historical Provider, MD   enoxaparin (LOVENOX) 100 MG/ML injection Inject into the skin 2 times daily 2/2/18   Historical Provider, MD   buPROPion (WELLBUTRIN SR) 150 MG extended release tablet Take 150 mg by mouth 2 times daily    Historical Provider, MD   cefTRIAXone (ROCEPHIN) 2 g injection Inject 2 g into the muscle every 24 hours    Historical Provider, MD   fluticasone (FLONASE) 50 MCG/ACT nasal spray 1 spray by Nasal route    Historical Provider, MD   acetaminophen (TYLENOL) 325 MG tablet Take 650 mg by mouth every 6 hours as needed for Pain    Historical Provider, MD   glyBURIDE (DIABETA) 2.5 MG tablet Take 2.5 mg by mouth    Historical Provider, MD

## 2018-02-15 ENCOUNTER — TELEPHONE (OUTPATIENT)
Dept: PODIATRY | Age: 53
End: 2018-02-15

## 2018-02-15 DIAGNOSIS — L97.512 RIGHT FOOT ULCER, WITH FAT LAYER EXPOSED (HCC): Primary | ICD-10-CM

## 2018-02-15 NOTE — TELEPHONE ENCOUNTER
Patient called, states there was supposed to be a script sent to his pharmacy for a solution to put on his foot and it is not there. He was last seen on 2/12/18. Please call patient back at [phone].

## 2018-02-17 LAB
CULTURE: ABNORMAL
DIRECT EXAM: ABNORMAL
DIRECT EXAM: ABNORMAL
Lab: ABNORMAL
ORGANISM: ABNORMAL
SPECIMEN DESCRIPTION: ABNORMAL
STATUS: ABNORMAL

## 2018-02-19 ENCOUNTER — OFFICE VISIT (OUTPATIENT)
Dept: WOUND CARE | Age: 53
End: 2018-02-19
Payer: MEDICAID

## 2018-02-19 ENCOUNTER — HOSPITAL ENCOUNTER (OUTPATIENT)
Dept: LAB | Age: 53
Setting detail: SPECIMEN
Discharge: HOME | End: 2018-02-19
Payer: MEDICAID

## 2018-02-19 VITALS
TEMPERATURE: 97.7 F | BODY MASS INDEX: 34.96 KG/M2 | DIASTOLIC BLOOD PRESSURE: 74 MMHG | SYSTOLIC BLOOD PRESSURE: 136 MMHG | HEART RATE: 76 BPM | RESPIRATION RATE: 20 BRPM | HEIGHT: 70 IN | WEIGHT: 244.2 LBS

## 2018-02-19 DIAGNOSIS — G89.29 PAIN, FOOT, CHRONIC, RIGHT: Primary | ICD-10-CM

## 2018-02-19 DIAGNOSIS — M79.671 PAIN, FOOT, CHRONIC, RIGHT: Primary | ICD-10-CM

## 2018-02-19 DIAGNOSIS — Z89.431 STATUS POST PARTIAL AMPUTATION OF FOOT, RIGHT (HCC): ICD-10-CM

## 2018-02-19 DIAGNOSIS — M79.671 RIGHT FOOT PAIN: ICD-10-CM

## 2018-02-19 DIAGNOSIS — L97.512 SKIN ULCER OF RIGHT FOOT WITH FAT LAYER EXPOSED (HCC): ICD-10-CM

## 2018-02-19 DIAGNOSIS — E11.42 DM TYPE 2 WITH DIABETIC PERIPHERAL NEUROPATHY (HCC): ICD-10-CM

## 2018-02-19 LAB
BUN BLDV-MCNC: 21 MG/DL (ref 6–20)
CREAT SERPL-MCNC: 0.79 MG/DL (ref 0.7–1.2)
GFR AFRICAN AMERICAN: >60 ML/MIN
GFR NON-AFRICAN AMERICAN: >60 ML/MIN
GFR SERPL CREATININE-BSD FRML MDRD: NORMAL ML/MIN/{1.73_M2}
GFR SERPL CREATININE-BSD FRML MDRD: NORMAL ML/MIN/{1.73_M2}

## 2018-02-19 PROCEDURE — 3017F COLORECTAL CA SCREEN DOC REV: CPT | Performed by: PODIATRIST

## 2018-02-19 PROCEDURE — 97597 DBRDMT OPN WND 1ST 20 CM/<: CPT | Performed by: PODIATRIST

## 2018-02-19 PROCEDURE — 99213 OFFICE O/P EST LOW 20 MIN: CPT | Performed by: PODIATRIST

## 2018-02-19 PROCEDURE — G8427 DOCREV CUR MEDS BY ELIG CLIN: HCPCS | Performed by: PODIATRIST

## 2018-02-19 PROCEDURE — 3046F HEMOGLOBIN A1C LEVEL >9.0%: CPT | Performed by: PODIATRIST

## 2018-02-19 PROCEDURE — G8484 FLU IMMUNIZE NO ADMIN: HCPCS | Performed by: PODIATRIST

## 2018-02-19 PROCEDURE — 84520 ASSAY OF UREA NITROGEN: CPT

## 2018-02-19 PROCEDURE — G8417 CALC BMI ABV UP PARAM F/U: HCPCS | Performed by: PODIATRIST

## 2018-02-19 PROCEDURE — 4004F PT TOBACCO SCREEN RCVD TLK: CPT | Performed by: PODIATRIST

## 2018-02-19 PROCEDURE — 82565 ASSAY OF CREATININE: CPT

## 2018-02-19 PROCEDURE — 36415 COLL VENOUS BLD VENIPUNCTURE: CPT

## 2018-02-19 RX ORDER — HYDROCODONE BITARTRATE AND ACETAMINOPHEN 5; 325 MG/1; MG/1
1 TABLET ORAL EVERY 6 HOURS PRN
Qty: 20 TABLET | Refills: 0 | Status: SHIPPED | OUTPATIENT
Start: 2018-02-19 | End: 2018-02-26

## 2018-02-19 NOTE — PATIENT INSTRUCTIONS
Discontinue previous dressing. Foam or silver foam dressing instructions. Cleanse wound with normal saline or in shower if allowed. If the foam ordered for you has an adhesive border, apply as you would a bandaid. If not bordered, cut foam a little larger than your wound. Lay the foam over the wound. Use tape, roll gauze or transparent film to hold in place. Change every day. Mail order dressings are from [facility]. You have 2 refills. Call [phone], ext. [phone]. SIGNS OF INFECTION  - Redness, swelling, skin hot  - Wound bed turns black or stringy yellow  - Foul odor  - Increased drainage or pus  - Increased pain  - Fever greater than 100F    CALL YOUR DOCTOR OR SEEK MEDICAL ATTENTION IF SIGNS OF INFECTION. DO NOT WAIT UNTIL YOUR NEXT APPOINTMENT    Call the Wound Care Nurse with any other questions or concerns- [phone]    Continue to use off loading surgical shoe at all times. Set up to see Pain Management. Return to see Juliet Lennox as scheduled.

## 2018-02-19 NOTE — PROGRESS NOTES
1. Pain, foot, chronic, right  Ambulatory referral to Pain Clinic   2. Skin ulcer of right foot with fat layer exposed ([address]  Ambulatory referral to Pain Clinic    MRI LOWER EXTREMITY RIGHT W JT W WO CONTRAST    HYDROcodone-acetaminophen (NORCO) 5-325 MG per tablet    LA DEBRIDEMENT OPEN WOUND 20 SQ CM<   3. DM type 2 with diabetic peripheral neuropathy (HCC)  HYDROcodone-acetaminophen (NORCO) 5-325 MG per tablet    LA DEBRIDEMENT OPEN WOUND 20 SQ CM<   4. Status post partial amputation of foot, right (HCC)  HYDROcodone-acetaminophen (NORCO) 5-325 MG per tablet   5. Right foot pain  HYDROcodone-acetaminophen (NORCO) 5-325 MG per tablet     Ulcer Classification:  Diabetic ulcer grade 2 C. IDSA  no infection. Plan:  Dressing: silver foam  continue offloading  smoking cessation advice given. Importance in relation to wound healing discussed. Pt should follow with PCP for potential assistance in quitting. Orders Placed This Encounter   Medications    HYDROcodone-acetaminophen (NORCO) 5-325 MG per tablet     Sig: Take 1 tablet by mouth every 6 hours as needed for Pain for up to 7 days. Dispense:  20 tablet     Refill:  0   Active wound management took place 100% non excisional with the use of  tissue nippers. All non viable tissue (including epidermis and/or dermis) and bio burden was removed to promote healing. Please see chart for exact measurements, if not documented then size was less than 20 sq cm. Culture reviewed with the pt in detail. All questions answered.   Orders Placed This Encounter   Procedures    MRI LOWER EXTREMITY RIGHT W JT W WO CONTRAST     Standing Status:   Future     Standing Expiration Date:   2/20/2019    Ambulatory referral to Pain Clinic     Referral Priority:   Routine     Referral Type:   Consult for Advice and Opinion     Referral Reason:   Specialty Services Required     Referred to Provider:   Bobby Rivers MD     Number of Visits Requested:   1    LA DEBRIDEMENT OPEN WOUND 20 SQ CM<     Recheck MRI to see any persisting osteomyelitis changes due to level of pain out of proportion to his condition. Also refer to pain mgmt for tighter control of this pain  Contact clinic with any questions/problems/concerns or new signs of infection. Follow[facility] in [facility] week(s).

## 2018-02-21 ENCOUNTER — TELEPHONE (OUTPATIENT)
Dept: PODIATRY | Age: 53
End: 2018-02-21

## 2018-02-21 DIAGNOSIS — L97.512 SKIN ULCER OF RIGHT FOOT WITH FAT LAYER EXPOSED (HCC): ICD-10-CM

## 2018-02-21 DIAGNOSIS — M86.171 OTHER ACUTE OSTEOMYELITIS OF RIGHT FOOT (HCC): Primary | ICD-10-CM

## 2018-02-21 NOTE — TELEPHONE ENCOUNTER
Please change order for MRI to [id number] mri foot right w wo contrast for correct protocol for foot. Thank you.

## 2018-02-26 ENCOUNTER — OFFICE VISIT (OUTPATIENT)
Dept: WOUND CARE | Age: 53
End: 2018-02-26
Payer: MEDICAID

## 2018-02-26 VITALS
TEMPERATURE: 98.1 F | WEIGHT: 242 LBS | HEART RATE: 84 BPM | HEIGHT: 70 IN | DIASTOLIC BLOOD PRESSURE: 80 MMHG | BODY MASS INDEX: 34.65 KG/M2 | SYSTOLIC BLOOD PRESSURE: 136 MMHG

## 2018-02-26 DIAGNOSIS — M72.2 PLANTAR FASCIITIS, RIGHT: ICD-10-CM

## 2018-02-26 DIAGNOSIS — Z89.431 STATUS POST PARTIAL AMPUTATION OF FOOT, RIGHT (HCC): ICD-10-CM

## 2018-02-26 DIAGNOSIS — L97.512 SKIN ULCER OF RIGHT FOOT WITH FAT LAYER EXPOSED (HCC): Primary | ICD-10-CM

## 2018-02-26 DIAGNOSIS — M86.171 OTHER ACUTE OSTEOMYELITIS OF RIGHT FOOT (HCC): ICD-10-CM

## 2018-02-26 DIAGNOSIS — E11.42 DM TYPE 2 WITH DIABETIC PERIPHERAL NEUROPATHY (HCC): ICD-10-CM

## 2018-02-26 PROCEDURE — G8427 DOCREV CUR MEDS BY ELIG CLIN: HCPCS | Performed by: PODIATRIST

## 2018-02-26 PROCEDURE — 3046F HEMOGLOBIN A1C LEVEL >9.0%: CPT | Performed by: PODIATRIST

## 2018-02-26 PROCEDURE — G8484 FLU IMMUNIZE NO ADMIN: HCPCS | Performed by: PODIATRIST

## 2018-02-26 PROCEDURE — 99212 OFFICE O/P EST SF 10 MIN: CPT | Performed by: PODIATRIST

## 2018-02-26 PROCEDURE — 3017F COLORECTAL CA SCREEN DOC REV: CPT | Performed by: PODIATRIST

## 2018-02-26 PROCEDURE — 4004F PT TOBACCO SCREEN RCVD TLK: CPT | Performed by: PODIATRIST

## 2018-02-26 PROCEDURE — 97597 DBRDMT OPN WND 1ST 20 CM/<: CPT | Performed by: PODIATRIST

## 2018-02-26 PROCEDURE — G8417 CALC BMI ABV UP PARAM F/U: HCPCS | Performed by: PODIATRIST

## 2018-02-26 PROCEDURE — L3040 FT ARCH SUPRT PREMOLD LONGIT: HCPCS | Performed by: PODIATRIST

## 2018-02-26 RX ORDER — CLINDAMYCIN HYDROCHLORIDE 300 MG/1
300 CAPSULE ORAL 3 TIMES DAILY
Qty: 30 CAPSULE | Refills: 0 | Status: SHIPPED | OUTPATIENT
Start: 2018-02-26 | End: 2018-03-08

## 2018-02-26 NOTE — PROGRESS NOTES
present upon palpation R sub 4th met head and 3rd interspace, no edema or erythema there at all. No palapble mass felt. . Normal medial longitudinal arch, bilateral.  Ankle ROM within normal limits,bilateral.  1st MPJ ROM within normal limits, bilateral.  Dorsally contracted digits absent. Partial R 5th ray amp. No other foot deformities. Integument:   Open lesion present, Right. Ulcer lateral R foot from midfoot down to distal foot. Erythema dorsal edge resolved, no streaking proximally, Positive fibrin, proximal point not as deep. no probing no malodor no signs of infx on edges. Interdigital maceration absent to web spaces , Bilateral.  Nails left none and right  none thickened, dystrophic and crumbly, discolored with subungual debris. Fissures absent, Bilateral. Hyperkeratotic tissue is absent.   Wound 01/02/18 #1 right lateral foot (Active)   Wound Type Wound 1/2/2018  9:28 AM   Dressing/Treatment Alginate 1/22/2018  9:40 AM   Wound Cleansed Rinsed/Irrigated with saline 2/26/2018 11:20 AM   Dressing Change Due 01/03/18 1/2/2018  9:52 AM   Wound Length (cm) 6.5 cm 2/26/2018 11:20 AM   Wound Width (cm) 0.8 cm 2/26/2018 11:20 AM   Wound Depth (cm)  0.5 2/26/2018 11:20 AM   Calculated Wound Size (cm^2) (l*w) 5.2 cm^2 2/26/2018 11:20 AM   Change in Wound Size % (l*w) 74.31 2/26/2018 11:20 AM   Wound Assessment Fibrin;Pink 2/26/2018 11:20 AM   Drainage Amount Small 2/26/2018 11:20 AM   Drainage Description Serosanguinous 2/26/2018 11:20 AM   Odor None 2/26/2018 11:20 AM   Margins Defined edges 2/12/2018  9:52 AM   Rosalind-wound Assessment Pale;Pink 2/26/2018 11:20 AM   Non-staged Wound Description Full thickness 1/2/2018  9:52 AM   [place]%Wound Bed 50 2/26/2018 11:20 AM   Red%Wound Bed 95 1/15/2018  9:17 AM   Yellow%Wound Bed 50 2/26/2018 11:20 AM   Number of days: 54             Asessment: Patient is a 46 y.o. male with:   1. Skin ulcer of right foot with fat layer exposed ([address]  NE DEBRIDEMENT OPEN WOUND 20 SQ CM<

## 2018-02-28 ENCOUNTER — HOSPITAL ENCOUNTER (OUTPATIENT)
Dept: MRI IMAGING | Age: 53
Discharge: HOME | End: 2018-03-02
Payer: MEDICAID

## 2018-02-28 ENCOUNTER — TELEPHONE (OUTPATIENT)
Dept: WOUND CARE | Age: 53
End: 2018-02-28

## 2018-02-28 DIAGNOSIS — L97.512 SKIN ULCER OF RIGHT FOOT WITH FAT LAYER EXPOSED (HCC): ICD-10-CM

## 2018-02-28 DIAGNOSIS — M86.171 OTHER ACUTE OSTEOMYELITIS OF RIGHT FOOT (HCC): ICD-10-CM

## 2018-02-28 PROCEDURE — 73723 MRI JOINT LWR EXTR W/O&W/DYE: CPT

## 2018-02-28 PROCEDURE — 6360000004 HC RX CONTRAST MEDICATION: Performed by: PODIATRIST

## 2018-02-28 PROCEDURE — A9579 GAD-BASE MR CONTRAST NOS,1ML: HCPCS | Performed by: PODIATRIST

## 2018-02-28 RX ADMIN — GADOTERIDOL 20 ML: 279.3 INJECTION, SOLUTION INTRAVENOUS at 10:00

## 2018-03-05 ENCOUNTER — OFFICE VISIT (OUTPATIENT)
Dept: WOUND CARE | Age: 53
End: 2018-03-05
Payer: MEDICAID

## 2018-03-05 VITALS
SYSTOLIC BLOOD PRESSURE: 122 MMHG | HEART RATE: 96 BPM | DIASTOLIC BLOOD PRESSURE: 86 MMHG | HEIGHT: 70 IN | TEMPERATURE: 96.5 F

## 2018-03-05 DIAGNOSIS — M86.9 OSTEOMYELITIS OF RIGHT FOOT, UNSPECIFIED TYPE (HCC): Primary | ICD-10-CM

## 2018-03-05 DIAGNOSIS — Z89.431 STATUS POST PARTIAL AMPUTATION OF FOOT, RIGHT (HCC): ICD-10-CM

## 2018-03-05 DIAGNOSIS — L97.512 SKIN ULCER OF RIGHT FOOT WITH FAT LAYER EXPOSED (HCC): ICD-10-CM

## 2018-03-05 DIAGNOSIS — E11.42 DM TYPE 2 WITH DIABETIC PERIPHERAL NEUROPATHY (HCC): ICD-10-CM

## 2018-03-05 PROCEDURE — 3017F COLORECTAL CA SCREEN DOC REV: CPT | Performed by: PODIATRIST

## 2018-03-05 PROCEDURE — 4004F PT TOBACCO SCREEN RCVD TLK: CPT | Performed by: PODIATRIST

## 2018-03-05 PROCEDURE — 11042 DBRDMT SUBQ TIS 1ST 20SQCM/<: CPT | Performed by: PODIATRIST

## 2018-03-05 PROCEDURE — G8417 CALC BMI ABV UP PARAM F/U: HCPCS | Performed by: PODIATRIST

## 2018-03-05 PROCEDURE — G8427 DOCREV CUR MEDS BY ELIG CLIN: HCPCS | Performed by: PODIATRIST

## 2018-03-05 PROCEDURE — 99213 OFFICE O/P EST LOW 20 MIN: CPT | Performed by: PODIATRIST

## 2018-03-05 PROCEDURE — 3046F HEMOGLOBIN A1C LEVEL >9.0%: CPT | Performed by: PODIATRIST

## 2018-03-05 PROCEDURE — G8484 FLU IMMUNIZE NO ADMIN: HCPCS | Performed by: PODIATRIST

## 2018-03-05 RX ORDER — CLINDAMYCIN HYDROCHLORIDE 300 MG/1
300 CAPSULE ORAL 3 TIMES DAILY
Qty: 30 CAPSULE | Refills: 0 | Status: SHIPPED | OUTPATIENT
Start: 2018-03-05 | End: 2018-03-08 | Stop reason: SDUPTHER

## 2018-03-05 NOTE — PATIENT INSTRUCTIONS
Continue with silver foam dressing and oral antibiotic as directed. F/u with Dr Ann Martines in 1 week.

## 2018-03-05 NOTE — PROGRESS NOTES
all.  No palapble mass felt. . Normal medial longitudinal arch, bilateral.  Ankle ROM within normal limits,bilateral.  1st MPJ ROM within normal limits, bilateral.  Dorsally contracted digits absent. Partial R 5th ray amp. No other foot deformities. Integument:   Open lesion present, Right. Ulcer lateral R foot from midfoot down to distal foot. No Erythema dorsal edge no streaking proximally, Positive fibrin, proximal point not as deep. Still does go into sub q tissue at deepest point but going in right direction overall,  no probing no malodor no signs of infx on edges. Interdigital maceration absent to web spaces , Bilateral.  Nails left none and right  none thickened, dystrophic and crumbly, discolored with subungual debris. Fissures absent, Bilateral. Hyperkeratotic tissue is absent. Wound 01/02/18 #1 right lateral foot (Active)   Wound Type Wound 1/2/2018  9:28 AM   Dressing/Treatment Foam 3/5/2018 10:46 AM   Wound Cleansed Rinsed/Irrigated with saline 3/5/2018 10:46 AM   Dressing Change Due 01/03/18 1/2/2018  9:52 AM   Wound Length (cm) 6 cm 3/5/2018 10:46 AM   Wound Width (cm) 1 cm 3/5/2018 10:46 AM   Wound Depth (cm)  0.3 3/5/2018 10:46 AM   Calculated Wound Size (cm^2) (l*w) 6 cm^2 3/5/2018 10:46 AM   Change in Wound Size % (l*w) 70.36 3/5/2018 10:46 AM   Wound Assessment Fibrin;Pale;Pink 3/5/2018 10:46 AM   Drainage Amount Moderate 3/5/2018 10:46 AM   Drainage Description Serosanguinous 3/5/2018 10:46 AM   Odor None 3/5/2018 10:46 AM   Margins Defined edges 2/12/2018  9:52 AM   Rosalind-wound Assessment Red;Painful;Swelling 3/5/2018 10:46 AM   Non-staged Wound Description Full thickness 1/2/2018  9:52 AM   [place]%Wound Bed 50 3/5/2018 10:46 AM   Red%Wound Bed 95 1/15/2018  9:17 AM   Yellow%Wound Bed 50 3/5/2018 10:46 AM   Number of days: 62       MRI: see report      Asessment: Patient is a 46 y.o. male with:   1. Osteomyelitis of right foot, unspecified type ([facility]     2.  DM type 2 with diabetic

## 2018-03-07 ENCOUNTER — TELEPHONE (OUTPATIENT)
Dept: WOUND CARE | Age: 53
End: 2018-03-07

## 2018-03-08 ENCOUNTER — OFFICE VISIT (OUTPATIENT)
Dept: PAIN MANAGEMENT | Age: 53
End: 2018-03-08
Payer: MEDICAID

## 2018-03-08 VITALS
WEIGHT: 240 LBS | DIASTOLIC BLOOD PRESSURE: 88 MMHG | HEART RATE: 88 BPM | BODY MASS INDEX: 34.36 KG/M2 | HEIGHT: 70 IN | SYSTOLIC BLOOD PRESSURE: 118 MMHG

## 2018-03-08 DIAGNOSIS — M86.9 OSTEOMYELITIS OF RIGHT FOOT, UNSPECIFIED TYPE (HCC): ICD-10-CM

## 2018-03-08 DIAGNOSIS — M79.671 FOOT PAIN, RIGHT: Primary | ICD-10-CM

## 2018-03-08 DIAGNOSIS — I80.201: ICD-10-CM

## 2018-03-08 DIAGNOSIS — Z79.01 ANTICOAGULANT LONG-TERM USE: ICD-10-CM

## 2018-03-08 DIAGNOSIS — Z86.73 HISTORY OF STROKE: ICD-10-CM

## 2018-03-08 DIAGNOSIS — Z89.431 STATUS POST PARTIAL AMPUTATION OF FOOT, RIGHT (HCC): ICD-10-CM

## 2018-03-08 DIAGNOSIS — E11.42 DM TYPE 2 WITH DIABETIC PERIPHERAL NEUROPATHY (HCC): ICD-10-CM

## 2018-03-08 PROCEDURE — 99204 OFFICE O/P NEW MOD 45 MIN: CPT | Performed by: PHYSICAL MEDICINE & REHABILITATION

## 2018-03-08 PROCEDURE — 4004F PT TOBACCO SCREEN RCVD TLK: CPT | Performed by: PHYSICAL MEDICINE & REHABILITATION

## 2018-03-08 PROCEDURE — G8427 DOCREV CUR MEDS BY ELIG CLIN: HCPCS | Performed by: PHYSICAL MEDICINE & REHABILITATION

## 2018-03-08 PROCEDURE — G8417 CALC BMI ABV UP PARAM F/U: HCPCS | Performed by: PHYSICAL MEDICINE & REHABILITATION

## 2018-03-08 PROCEDURE — G8484 FLU IMMUNIZE NO ADMIN: HCPCS | Performed by: PHYSICAL MEDICINE & REHABILITATION

## 2018-03-08 PROCEDURE — 3017F COLORECTAL CA SCREEN DOC REV: CPT | Performed by: PHYSICAL MEDICINE & REHABILITATION

## 2018-03-08 PROCEDURE — 3046F HEMOGLOBIN A1C LEVEL >9.0%: CPT | Performed by: PHYSICAL MEDICINE & REHABILITATION

## 2018-03-08 NOTE — PROGRESS NOTES
PAIN MANAGEMENT NEW PATIENT CONSULTATION  3/8/18    Callum Montgomery  1965    Referring Provider:  Manish Persaud DPM  [address] [facility], [place] [place], [place] [address] [facility]    Primary Care Physician:  Lyudmila Mcdowell MD  [address] [place] [phone]    HPI information obtained from the patient as well as the Comprehensive Pain Management Health Questionnaire filled out by the patient. The questionnaire will be scanned into the electronic chart and PMH, PSH, meds, and allergies will be updates accordingly. Subjective:       CHIEF COMPLAINT:  This is a  46 y.o. male patient who presents with a complaint of Foot Pain (new pt visit, referred by Dr. Mechelle Aj)             PAIN HPI:    HPI  Pain in R foot  From fracture  Of mid foot toes , and osteomyelitis  And diabetic nerve pain ./ States also   Has   A quick burst pain when tries to walk  And bear weight  Has been cleared to try to bear wweight. Prior evaluation:    Previous lower back problems: Yes    Previous workup: Yes   History of surgery in painful area: Yes  Current pain is in R foot, had spinal lumbar fusion  Causes a tolerable lower back pain    Previous pain medication trials have included:       Opioids: hydrocodone/acetaminophen (Lorcet, Lortab, Norco, Vicodin)     NSAID's:contraindicated on coumadin     Muscle relaxants: na     Neuropathic pain meds: na    Previous Pain Management Physician: No   Nerve blocks, spinal injections: No   Type of Pain Intervention na. Date of last Pain Intervention  January / Ashwini Jay   Was there pain relief from Pain Intervention: na.    How long was your pain relief from the Pain Intervention . Sleep:    Sleep disturbance: No   Difficulty falling asleep:  No   Feels fatigued much of the time: No   Wakes up rested: No   Awakens in the middle of the night: No   Takes sleeping medication: No    Mental health:    Patient feels na secondary to their current pain problems as described above.    H/O depression and anxiety: medications prior to visit. Past Medical History:   Diagnosis Date    CAD (coronary artery disease)     Depression     Diabetes mellitus ([facility]     Hyperlipidemia     Hypertension        Past Surgical History:   Procedure Laterality Date    SPINAL FUSION      L4 and L5    TOE AMPUTATION Right 09/04/2017       History reviewed. No pertinent family history. Social History     Social History    Marital status:      Spouse name: N/A    Number of children: N/A    Years of education: N/A     Social History Main Topics    Smoking status: Current Every Day Smoker     Packs/day: 1.00     Types: Cigarettes    Smokeless tobacco: Never Used    Alcohol use Yes      Comment: rarely    Drug use: Yes    Sexual activity: Not Asked     Other Topics Concern    None     Social History Narrative    None         Objective:     Physical Exam:  Vitals:    03/08/18 1518   BP: 118/88   Site: Left Arm   Position: Sitting   Cuff Size: Medium Adult   Pulse: 88   Weight: 240 lb (108.9 kg)   Height: 5' 10\" (1.778 m)     Pain Score:   7    Physical Exam HEENT wnl  In W/C  Nl appearance  R foot in bandage    Had abnormality of  Lateral foot    Labs:   Lab Results   Component Value Date    WBC 7.9 10/17/2017    HGB 13.6 10/17/2017    HCT 41.4 10/17/2017     10/17/2017     10/17/2017    K 4.9 10/17/2017    CL 96 (L) 10/17/2017    CREATININE 0.79 02/19/2018    BUN 21 (H) 02/19/2018    CO2 26 10/17/2017    INR 1.9 02/08/2018                                                             Assessment: This is a 46 y.o. male with the following diagnosis:     Pain Diagnoses:  1. Foot pain, right    2. Osteomyelitis of right foot, unspecified type ([facility]    3.  DM type 2 with diabetic peripheral neuropathy ([facility]    4. Status post partial amputation of foot, right [facility])        Medical/ Psychological Comorbidities:  As listed in the past medical and surgical history    Functional Limitations secondary to the above problems:  Chronic pain limits function and quality of life    Plan:     1. Will  Prescribe Gabapentin at 800 TID was on before then reduced recently  , no contraindications  That I note. 2. Limited help with pain with Norco won't prescribe at this   3. SCS contraindicated with osteomyelitis  4. Consider  Long term  Cast boot so called floating foot  5. Use of cane walker or  Wheelchair for obility in future as needed, still feel can be healing of foot     Meds:   New Prescriptions    No medications on file      No orders of the defined types were placed in this encounter. Controlled Substances Monitoring:    OARRS [place] was reviewed for [place], [place], and [place]. Pt educated about the risks of taking opiates, including increased sedation, constipation, slowed breathing, tolerance, dependence, and addiction. No orders of the defined types were placed in this encounter. No Follow-up on file. The patient expressed understanding of the above assessment and plan. Total time spent face to face with patient was 25 minutes in which  50% or more of the time was spent in counseling, education about risk and benefits of the above plan, and coordination of care.

## 2018-03-12 ENCOUNTER — TELEPHONE (OUTPATIENT)
Dept: WOUND CARE | Age: 53
End: 2018-03-12

## 2018-03-12 NOTE — TELEPHONE ENCOUNTER
Patient called into the office, he was unable to keep this Wound Care appointment today, 3/12/18, with Dr. Mayra Chand due to his transportation canceled at the last minute. He is going to go through a transportation company, [employer] and they require 2 days ahead of time so he is wondering if he can get his appointment rescheduled for a Thursday this week? Please call him back to discuss at [phone].

## 2018-03-23 ENCOUNTER — OFFICE VISIT (OUTPATIENT)
Dept: PODIATRY | Age: 53
End: 2018-03-23
Payer: MEDICAID

## 2018-03-23 VITALS
HEIGHT: 70 IN | HEART RATE: 88 BPM | SYSTOLIC BLOOD PRESSURE: 118 MMHG | DIASTOLIC BLOOD PRESSURE: 78 MMHG | TEMPERATURE: 97.7 F | BODY MASS INDEX: 34.36 KG/M2 | WEIGHT: 240 LBS

## 2018-03-23 DIAGNOSIS — L97.512 SKIN ULCER OF RIGHT FOOT WITH FAT LAYER EXPOSED (HCC): Primary | ICD-10-CM

## 2018-03-23 DIAGNOSIS — Z89.431 STATUS POST PARTIAL AMPUTATION OF FOOT, RIGHT (HCC): ICD-10-CM

## 2018-03-23 DIAGNOSIS — M86.9 OSTEOMYELITIS OF RIGHT FOOT, UNSPECIFIED TYPE (HCC): ICD-10-CM

## 2018-03-23 DIAGNOSIS — E11.42 DM TYPE 2 WITH DIABETIC PERIPHERAL NEUROPATHY (HCC): ICD-10-CM

## 2018-03-23 PROCEDURE — 11042 DBRDMT SUBQ TIS 1ST 20SQCM/<: CPT | Performed by: PODIATRIST

## 2018-03-23 RX ORDER — CLINDAMYCIN HYDROCHLORIDE 300 MG/1
300 CAPSULE ORAL 3 TIMES DAILY
Qty: 30 CAPSULE | Refills: 0 | Status: SHIPPED | OUTPATIENT
Start: 2018-03-23 | End: 2018-04-02

## 2018-03-23 NOTE — PROGRESS NOTES
Subjective:    Jorge Luis Morales is a 46 y.o. male who presents with the ulcer in R foot. Pt finished the po abx well. pt has been wearing sx shoe. Patient has been compliant with off loading. Currently denies F/C/N/V. Overall diabetic control is not changed. Allergies   Allergen Reactions    Pcn [Penicillins] Rash       Past Medical History:   Diagnosis Date    CAD (coronary artery disease)     Depression     [facility] mellitus [facility]     Hyperlipidemia     Hypertension        Prior to Admission medications    Medication Sig Start Date End Date Taking? Authorizing Provider   Sodium Hypochlorite 0.0125 % SOLN Apply topically as moist to dry dressing to right foot wound twice daily. 2/15/18  Yes Shah Old, DPM   warfarin (COUMADIN) 5 MG tablet Take 5 mg by mouth 2/3/18  Yes Historical Provider, MD   docusate sodium (COLACE) 100 MG capsule Take 100 mg by mouth 2 times daily   Yes Historical Provider, MD   enoxaparin (LOVENOX) 100 MG/ML injection Inject into the skin 2 times daily 2/2/18  Yes Historical Provider, MD   buPROPion (WELLBUTRIN SR) 150 MG extended release tablet Take 150 mg by mouth 2 times daily   Yes Historical Provider, MD   cefTRIAXone (ROCEPHIN) 2 g injection Inject 2 g into the muscle every 24 hours   Yes Historical Provider, MD   fluticasone (FLONASE) 50 MCG/ACT nasal spray 1 spray by Nasal route   Yes Historical Provider, MD   acetaminophen (TYLENOL) 325 MG tablet Take 650 mg by mouth every 6 hours as needed for Pain   Yes Historical Provider, MD   glyBURIDE (DIABETA) 2.5 MG tablet Take 2.5 mg by mouth   Yes Historical Provider, MD   gabapentin (NEURONTIN) 400 MG capsule Take 400 mg by mouth every evening .    Yes Historical Provider, MD   lovastatin (MEVACOR) 10 MG tablet Take 1 tablet by mouth daily    Yes Historical Provider, MD   traZODone (DESYREL) 150 MG tablet Take 150 mg by mouth nightly   Yes Historical Provider, MD   lisinopril (PRINIVIL;ZESTRIL) 20 MG tablet Take 20 mg by to distal foot. No Erythema dorsal edge no streaking proximally, Positive fibrin, proximal point not as deep. Still does go into sub q tissue at deepest point   no probing no malodor no signs of infx on edges. Interdigital maceration absent to web spaces , Bilateral.  Nails left none and right  none thickened, dystrophic and crumbly, discolored with subungual debris. Fissures absent, Bilateral. Hyperkeratotic tissue is absent. Wound 01/02/18 #1 right lateral foot (Active)   Wound Type Wound 1/2/2018  9:28 AM   Dressing/Treatment Foam 3/23/2018 11:28 AM   Wound Cleansed Rinsed/Irrigated with saline 3/23/2018 11:28 AM   Dressing Change Due 01/03/18 1/2/2018  9:52 AM   Wound Length (cm) 4.5 cm 3/23/2018 11:28 AM   Wound Width (cm) 0.5 cm 3/23/2018 11:28 AM   Wound Depth (cm)  0.2 3/23/2018 11:28 AM   Calculated Wound Size (cm^2) (l*w) 2.25 cm^2 3/23/2018 11:28 AM   Change in Wound Size % (l*w) 88.88 3/23/2018 11:28 AM   Wound Assessment Fibrin;Pale;Pink 3/5/2018 10:46 AM   Drainage Amount Small 3/23/2018 11:28 AM   Drainage Description Serosanguinous 3/23/2018 11:28 AM   Odor Mild 3/23/2018 11:28 AM   Margins Defined edges 2/12/2018  9:52 AM   Rosalind-wound Assessment Painful;Maceration 3/23/2018 11:28 AM   Non-staged Wound Description Full thickness 1/2/2018  9:52 AM   [place]%Wound Bed 50 3/23/2018 11:28 AM   Red%Wound Bed 95 1/15/2018  9:17 AM   Yellow%Wound Bed 50 3/23/2018 11:28 AM   Number of days: 80             Asessment: Patient is a 46 y.o. male with:   1. Skin ulcer of right foot with fat layer exposed ([address]     2. Osteomyelitis of right foot, unspecified type ([address]     3. DM type 2 with diabetic peripheral neuropathy (HCC)     4. Status post partial amputation of foot, right (HCC)       Ulcer Classification:  Diabetic ulcer grade 2 C. IDSA  no infection. Plan:  Dressing: silver foam  continue offloading  smoking cessation advice given. Importance in relation to wound healing discussed.   Pt should follow with PCP for potential assistance in quitting. Orders Placed This Encounter   Medications    clindamycin (CLEOCIN) 300 MG capsule     Sig: Take 1 capsule by mouth 3 times daily for 10 days     Dispense:  30 capsule     Refill:  0     Sharp excisional debridement took place of the ulceration, sub-cutaneous in nature,  curette and tissue nippers were used for debridement. All non viable and necrotic tissue was removed to promote healing. See wound assessment note for post debridement measurements. Contact clinic with any questions/problems/concerns or new signs of infection. Follow[facility]up at [facility] in 1 week(s).

## 2018-04-02 ENCOUNTER — TELEPHONE (OUTPATIENT)
Dept: WOUND CARE | Age: 53
End: 2018-04-02

## 2018-04-09 ENCOUNTER — OFFICE VISIT (OUTPATIENT)
Dept: WOUND CARE | Age: 53
End: 2018-04-09
Payer: MEDICAID

## 2018-04-09 VITALS
WEIGHT: 237 LBS | DIASTOLIC BLOOD PRESSURE: 78 MMHG | HEIGHT: 70 IN | SYSTOLIC BLOOD PRESSURE: 132 MMHG | HEART RATE: 88 BPM | BODY MASS INDEX: 33.93 KG/M2 | TEMPERATURE: 97.6 F

## 2018-04-09 DIAGNOSIS — L97.512 SKIN ULCER OF RIGHT FOOT WITH FAT LAYER EXPOSED (HCC): Primary | ICD-10-CM

## 2018-04-09 DIAGNOSIS — E11.42 DM TYPE 2 WITH DIABETIC PERIPHERAL NEUROPATHY (HCC): ICD-10-CM

## 2018-04-09 DIAGNOSIS — Z89.431 STATUS POST PARTIAL AMPUTATION OF FOOT, RIGHT (HCC): ICD-10-CM

## 2018-04-09 PROCEDURE — 97597 DBRDMT OPN WND 1ST 20 CM/<: CPT | Performed by: PODIATRIST

## 2018-04-16 ENCOUNTER — OFFICE VISIT (OUTPATIENT)
Dept: WOUND CARE | Age: 53
End: 2018-04-16
Payer: MEDICAID

## 2018-04-16 VITALS
TEMPERATURE: 97 F | SYSTOLIC BLOOD PRESSURE: 134 MMHG | DIASTOLIC BLOOD PRESSURE: 80 MMHG | RESPIRATION RATE: 20 BRPM | BODY MASS INDEX: 35.71 KG/M2 | WEIGHT: 249.4 LBS | HEIGHT: 70 IN | HEART RATE: 84 BPM

## 2018-04-16 DIAGNOSIS — L97.512 SKIN ULCER OF RIGHT FOOT WITH FAT LAYER EXPOSED (HCC): Primary | ICD-10-CM

## 2018-04-16 DIAGNOSIS — E11.42 DM TYPE 2 WITH DIABETIC PERIPHERAL NEUROPATHY (HCC): ICD-10-CM

## 2018-04-16 PROCEDURE — 97597 DBRDMT OPN WND 1ST 20 CM/<: CPT | Performed by: PODIATRIST

## 2018-04-20 ENCOUNTER — NURSE ONLY (OUTPATIENT)
Dept: WOUND CARE | Age: 53
End: 2018-04-20

## 2018-04-20 DIAGNOSIS — L97.512 SKIN ULCER OF RIGHT FOOT WITH FAT LAYER EXPOSED (HCC): ICD-10-CM

## 2018-04-20 DIAGNOSIS — Z89.431 STATUS POST PARTIAL AMPUTATION OF FOOT, RIGHT (HCC): ICD-10-CM

## 2018-04-20 DIAGNOSIS — E11.42 DM TYPE 2 WITH DIABETIC PERIPHERAL NEUROPATHY (HCC): ICD-10-CM

## 2018-04-20 DIAGNOSIS — M86.9 OSTEOMYELITIS OF RIGHT FOOT, UNSPECIFIED TYPE (HCC): ICD-10-CM

## 2018-04-23 ENCOUNTER — OFFICE VISIT (OUTPATIENT)
Dept: WOUND CARE | Age: 53
End: 2018-04-23
Payer: MEDICAID

## 2018-04-23 ENCOUNTER — TELEPHONE (OUTPATIENT)
Dept: WOUND CARE | Age: 53
End: 2018-04-23

## 2018-04-23 VITALS
HEART RATE: 80 BPM | WEIGHT: 247.6 LBS | BODY MASS INDEX: 35.45 KG/M2 | RESPIRATION RATE: 18 BRPM | SYSTOLIC BLOOD PRESSURE: 148 MMHG | DIASTOLIC BLOOD PRESSURE: 80 MMHG | TEMPERATURE: 98.2 F | HEIGHT: 70 IN

## 2018-04-23 DIAGNOSIS — Z89.431 STATUS POST PARTIAL AMPUTATION OF FOOT, RIGHT (HCC): ICD-10-CM

## 2018-04-23 DIAGNOSIS — L97.512 SKIN ULCER OF RIGHT FOOT WITH FAT LAYER EXPOSED (HCC): Primary | ICD-10-CM

## 2018-04-23 DIAGNOSIS — E11.42 DM TYPE 2 WITH DIABETIC PERIPHERAL NEUROPATHY (HCC): ICD-10-CM

## 2018-04-23 PROCEDURE — 97597 DBRDMT OPN WND 1ST 20 CM/<: CPT | Performed by: PODIATRIST

## 2018-04-30 ENCOUNTER — OFFICE VISIT (OUTPATIENT)
Dept: WOUND CARE | Age: 53
End: 2018-04-30
Payer: MEDICAID

## 2018-04-30 VITALS
SYSTOLIC BLOOD PRESSURE: 138 MMHG | DIASTOLIC BLOOD PRESSURE: 80 MMHG | WEIGHT: 252 LBS | HEIGHT: 70 IN | BODY MASS INDEX: 36.08 KG/M2

## 2018-04-30 DIAGNOSIS — R60.0 EDEMA OF RIGHT FOOT: ICD-10-CM

## 2018-04-30 DIAGNOSIS — E11.42 DM TYPE 2 WITH DIABETIC PERIPHERAL NEUROPATHY (HCC): ICD-10-CM

## 2018-04-30 DIAGNOSIS — Z89.431 STATUS POST PARTIAL AMPUTATION OF FOOT, RIGHT (HCC): ICD-10-CM

## 2018-04-30 DIAGNOSIS — L97.512 SKIN ULCER OF RIGHT FOOT WITH FAT LAYER EXPOSED (HCC): Primary | ICD-10-CM

## 2018-04-30 PROCEDURE — 97597 DBRDMT OPN WND 1ST 20 CM/<: CPT | Performed by: PODIATRIST

## 2018-05-07 ENCOUNTER — TELEPHONE (OUTPATIENT)
Dept: WOUND CARE | Age: 53
End: 2018-05-07

## 2018-05-14 ENCOUNTER — OFFICE VISIT (OUTPATIENT)
Dept: WOUND CARE | Age: 53
End: 2018-05-14
Payer: MEDICAID

## 2018-05-14 VITALS
BODY MASS INDEX: 34.73 KG/M2 | DIASTOLIC BLOOD PRESSURE: 76 MMHG | SYSTOLIC BLOOD PRESSURE: 124 MMHG | HEIGHT: 70 IN | RESPIRATION RATE: 20 BRPM | TEMPERATURE: 95.2 F | HEART RATE: 84 BPM | WEIGHT: 242.6 LBS

## 2018-05-14 DIAGNOSIS — Z89.431 STATUS POST PARTIAL AMPUTATION OF FOOT, RIGHT (HCC): ICD-10-CM

## 2018-05-14 DIAGNOSIS — E11.42 DM TYPE 2 WITH DIABETIC PERIPHERAL NEUROPATHY (HCC): ICD-10-CM

## 2018-05-14 DIAGNOSIS — L97.512 SKIN ULCER OF RIGHT FOOT WITH FAT LAYER EXPOSED (HCC): Primary | ICD-10-CM

## 2018-05-14 PROCEDURE — 99999 PR OFFICE/OUTPT VISIT,PROCEDURE ONLY: CPT | Performed by: PODIATRIST

## 2018-05-14 PROCEDURE — 97597 DBRDMT OPN WND 1ST 20 CM/<: CPT | Performed by: PODIATRIST

## 2018-05-21 ENCOUNTER — OFFICE VISIT (OUTPATIENT)
Dept: WOUND CARE | Age: 53
End: 2018-05-21
Payer: MEDICAID

## 2018-05-21 VITALS
DIASTOLIC BLOOD PRESSURE: 80 MMHG | SYSTOLIC BLOOD PRESSURE: 128 MMHG | WEIGHT: 243 LBS | TEMPERATURE: 97.9 F | HEART RATE: 72 BPM | BODY MASS INDEX: 34.79 KG/M2 | HEIGHT: 70 IN

## 2018-05-21 DIAGNOSIS — Z89.431 STATUS POST PARTIAL AMPUTATION OF FOOT, RIGHT (HCC): ICD-10-CM

## 2018-05-21 DIAGNOSIS — E11.42 DM TYPE 2 WITH DIABETIC PERIPHERAL NEUROPATHY (HCC): ICD-10-CM

## 2018-05-21 DIAGNOSIS — L97.512 SKIN ULCER OF RIGHT FOOT WITH FAT LAYER EXPOSED (HCC): Primary | ICD-10-CM

## 2018-05-21 PROCEDURE — 97597 DBRDMT OPN WND 1ST 20 CM/<: CPT | Performed by: PODIATRIST

## 2018-05-21 PROCEDURE — 99999 PR OFFICE/OUTPT VISIT,PROCEDURE ONLY: CPT | Performed by: PODIATRIST

## 2018-05-29 ENCOUNTER — OFFICE VISIT (OUTPATIENT)
Dept: WOUND CARE | Age: 53
End: 2018-05-29
Payer: MEDICAID

## 2018-05-29 VITALS
RESPIRATION RATE: 20 BRPM | HEIGHT: 70 IN | TEMPERATURE: 97.5 F | DIASTOLIC BLOOD PRESSURE: 82 MMHG | HEART RATE: 88 BPM | WEIGHT: 244.4 LBS | BODY MASS INDEX: 34.99 KG/M2 | SYSTOLIC BLOOD PRESSURE: 130 MMHG

## 2018-05-29 DIAGNOSIS — Z89.431 STATUS POST PARTIAL AMPUTATION OF FOOT, RIGHT (HCC): ICD-10-CM

## 2018-05-29 DIAGNOSIS — L97.512 SKIN ULCER OF RIGHT FOOT WITH FAT LAYER EXPOSED (HCC): Primary | ICD-10-CM

## 2018-05-29 DIAGNOSIS — E11.42 DM TYPE 2 WITH DIABETIC PERIPHERAL NEUROPATHY (HCC): ICD-10-CM

## 2018-05-29 PROCEDURE — 97597 DBRDMT OPN WND 1ST 20 CM/<: CPT | Performed by: PODIATRIST

## 2018-05-29 PROCEDURE — 99999 PR OFFICE/OUTPT VISIT,PROCEDURE ONLY: CPT | Performed by: PODIATRIST

## 2018-06-04 ENCOUNTER — OFFICE VISIT (OUTPATIENT)
Dept: WOUND CARE | Age: 53
End: 2018-06-04
Payer: MEDICAID

## 2018-06-04 VITALS
HEIGHT: 70 IN | SYSTOLIC BLOOD PRESSURE: 122 MMHG | TEMPERATURE: 98.1 F | DIASTOLIC BLOOD PRESSURE: 80 MMHG | BODY MASS INDEX: 35.22 KG/M2 | HEART RATE: 80 BPM | WEIGHT: 246 LBS

## 2018-06-04 DIAGNOSIS — E11.42 DM TYPE 2 WITH DIABETIC PERIPHERAL NEUROPATHY (HCC): ICD-10-CM

## 2018-06-04 DIAGNOSIS — L97.512 SKIN ULCER OF RIGHT FOOT WITH FAT LAYER EXPOSED (HCC): Primary | ICD-10-CM

## 2018-06-04 PROCEDURE — 99212 OFFICE O/P EST SF 10 MIN: CPT | Performed by: PODIATRIST

## 2018-08-03 ENCOUNTER — HOSPITAL ENCOUNTER (EMERGENCY)
Age: 53
Discharge: HOME | End: 2018-08-03
Attending: EMERGENCY MEDICINE

## 2018-08-03 ENCOUNTER — APPOINTMENT (OUTPATIENT)
Dept: INTERVENTIONAL RADIOLOGY/VASCULAR | Age: 53
End: 2018-08-03

## 2018-08-03 ENCOUNTER — APPOINTMENT (OUTPATIENT)
Dept: CT IMAGING | Age: 53
End: 2018-08-03

## 2018-08-03 VITALS
RESPIRATION RATE: 14 BRPM | SYSTOLIC BLOOD PRESSURE: 166 MMHG | HEART RATE: 95 BPM | TEMPERATURE: 98.2 F | OXYGEN SATURATION: 96 % | DIASTOLIC BLOOD PRESSURE: 98 MMHG

## 2018-08-03 DIAGNOSIS — R00.0 TACHYCARDIA: ICD-10-CM

## 2018-08-03 DIAGNOSIS — M79.604 PAIN OF RIGHT LOWER EXTREMITY: Primary | ICD-10-CM

## 2018-08-03 LAB
ABSOLUTE EOS #: 0.1 K/UL (ref 0–0.4)
ABSOLUTE IMMATURE GRANULOCYTE: ABNORMAL K/UL (ref 0–0.3)
ABSOLUTE LYMPH #: 3.2 K/UL (ref 1–4.8)
ABSOLUTE MONO #: 0.7 K/UL (ref 0.1–1.2)
ANION GAP SERPL CALCULATED.3IONS-SCNC: 16 MMOL/L (ref 9–17)
BASOPHILS # BLD: 1 % (ref 0–2)
BASOPHILS ABSOLUTE: 0.1 K/UL (ref 0–0.2)
BUN BLDV-MCNC: 13 MG/DL (ref 6–20)
BUN/CREAT BLD: 20 (ref 9–20)
CALCIUM SERPL-MCNC: 9.6 MG/DL (ref 8.6–10.4)
CHLORIDE BLD-SCNC: 96 MMOL/L (ref 98–107)
CO2: 26 MMOL/L (ref 20–31)
CREAT SERPL-MCNC: 0.65 MG/DL (ref 0.7–1.2)
DIFFERENTIAL TYPE: ABNORMAL
EKG ATRIAL RATE: 106 BPM
EKG P AXIS: 74 DEGREES
EKG P-R INTERVAL: 148 MS
EKG Q-T INTERVAL: 340 MS
EKG QRS DURATION: 90 MS
EKG QTC CALCULATION (BAZETT): 451 MS
EKG R AXIS: 79 DEGREES
EKG T AXIS: 69 DEGREES
EKG VENTRICULAR RATE: 106 BPM
EOSINOPHILS RELATIVE PERCENT: 1 % (ref 1–8)
GFR AFRICAN AMERICAN: >60 ML/MIN
GFR NON-AFRICAN AMERICAN: >60 ML/MIN
GFR SERPL CREATININE-BSD FRML MDRD: ABNORMAL ML/MIN/{1.73_M2}
GFR SERPL CREATININE-BSD FRML MDRD: ABNORMAL ML/MIN/{1.73_M2}
GLUCOSE BLD-MCNC: 201 MG/DL (ref 70–99)
HCT VFR BLD CALC: 47 % (ref 41–53)
HEMOGLOBIN: 16.4 G/DL (ref 13.5–17.5)
IMMATURE GRANULOCYTES: ABNORMAL %
INR BLD: 1
LYMPHOCYTES # BLD: 22 % (ref 15–43)
MCH RBC QN AUTO: 32.3 PG (ref 26–34)
MCHC RBC AUTO-ENTMCNC: 34.8 G/DL (ref 31–37)
MCV RBC AUTO: 93 FL (ref 80–100)
MONOCYTES # BLD: 5 % (ref 6–14)
NRBC AUTOMATED: ABNORMAL PER 100 WBC
PARTIAL THROMBOPLASTIN TIME: 27.1 SEC (ref 27–35)
PDW BLD-RTO: 13.3 % (ref 11–14.5)
PLATELET # BLD: 297 K/UL (ref 140–450)
PLATELET ESTIMATE: ABNORMAL
PMV BLD AUTO: 7.9 FL (ref 6–12)
POTASSIUM SERPL-SCNC: 3.8 MMOL/L (ref 3.7–5.3)
PROTHROMBIN TIME: 10.1 SEC (ref 9.4–11.3)
RBC # BLD: 5.06 M/UL (ref 4.5–5.9)
RBC # BLD: ABNORMAL 10*6/UL
SEG NEUTROPHILS: 71 % (ref 44–74)
SEGMENTED NEUTROPHILS ABSOLUTE COUNT: 10.5 K/UL (ref 1.8–7.7)
SODIUM BLD-SCNC: 138 MMOL/L (ref 135–144)
TROPONIN INTERP: NORMAL
TROPONIN T: <0.03 NG/ML
WBC # BLD: 14.6 K/UL (ref 3.5–11)
WBC # BLD: ABNORMAL 10*3/UL

## 2018-08-03 PROCEDURE — 85610 PROTHROMBIN TIME: CPT

## 2018-08-03 PROCEDURE — 36415 COLL VENOUS BLD VENIPUNCTURE: CPT

## 2018-08-03 PROCEDURE — 99284 EMERGENCY DEPT VISIT MOD MDM: CPT

## 2018-08-03 PROCEDURE — 2709999900 CT CHEST PULMONARY EMBOLISM W CONTRAST

## 2018-08-03 PROCEDURE — 93005 ELECTROCARDIOGRAM TRACING: CPT

## 2018-08-03 PROCEDURE — 93971 EXTREMITY STUDY: CPT

## 2018-08-03 PROCEDURE — 85025 COMPLETE CBC W/AUTO DIFF WBC: CPT

## 2018-08-03 PROCEDURE — 80048 BASIC METABOLIC PNL TOTAL CA: CPT

## 2018-08-03 PROCEDURE — 85730 THROMBOPLASTIN TIME PARTIAL: CPT

## 2018-08-03 PROCEDURE — 84484 ASSAY OF TROPONIN QUANT: CPT

## 2018-08-03 PROCEDURE — 6360000004 HC RX CONTRAST MEDICATION: Performed by: EMERGENCY MEDICINE

## 2018-08-03 RX ADMIN — IOPAMIDOL 100 ML: 755 INJECTION, SOLUTION INTRAVENOUS at 22:50

## 2018-08-04 ASSESSMENT — ENCOUNTER SYMPTOMS
SHORTNESS OF BREATH: 0
VOMITING: 0
NAUSEA: 0

## 2018-08-04 NOTE — ED PROVIDER NOTES
MEDICATIONS       Discharge Medication List as of 8/3/2018 11:25 PM      CONTINUE these medications which have NOT CHANGED    Details   Sodium Hypochlorite 0.0125 % SOLN Apply topically as moist to dry dressing to right foot wound twice daily. , Disp-1 Bottle, R-1, DAWNormal      warfarin (COUMADIN) 5 MG tablet Take 5 mg by mouthHistorical Med      docusate sodium (COLACE) 100 MG capsule Take 100 mg by mouth 2 times dailyHistorical Med      enoxaparin (LOVENOX) 100 MG/ML injection Inject into the skin 2 times dailyHistorical Med      buPROPion (WELLBUTRIN SR) 150 MG extended release tablet Take 150 mg by mouth 2 times dailyHistorical Med      fluticasone (FLONASE) 50 MCG/ACT nasal spray 1 spray by Nasal routeHistorical Med      acetaminophen (TYLENOL) 325 MG tablet Take 650 mg by mouth every 6 hours as needed for PainHistorical Med      glyBURIDE (DIABETA) 2.5 MG tablet Take 5 mg by mouth Historical Med      gabapentin (NEURONTIN) 400 MG capsule Take 400 mg by mouth every evening . Historical Med      lovastatin (MEVACOR) 10 MG tablet Take 1 tablet by mouth daily Historical Med      traZODone (DESYREL) 150 MG tablet Take 150 mg by mouth nightlyHistorical Med      lisinopril (PRINIVIL;ZESTRIL) 20 MG tablet Take 20 mg by mouth dailyHistorical Med      metFORMIN (GLUCOPHAGE) 500 MG tablet Take 1 tablet by mouth 2 times daily Historical Med      ibuprofen (ADVIL;MOTRIN) 800 MG tablet Take 1 tablet by mouth every 8 hours as needed for Pain, Disp-30 tablet, R-0Print             ALLERGIES     is allergic to pcn [penicillins]. FAMILY HISTORY     has no family status information on file. family history is not on file. SOCIAL HISTORY      reports that he has been smoking Cigarettes. He has been smoking about 1.00 pack per day. He has never used smokeless tobacco. He reports that he drinks alcohol. He reports that he uses drugs. PHYSICAL EXAM     INITIAL VITALS:  tympanic temperature is 98.2 °F (36.8 °C).  His of Exam: Subsequent/Follow-up FINDINGS: Pulmonary Arteries: For evaluation of pulmonary embolism, the study is limited. There is sub optimal enhancement of the pulmonary arteries, possibly secondary to timing of scanning in relation to IV contrast bolus arrival in the pulmonary arteries. Small emboli could potentially be obscured and not visualized. Within these limitations no clear evidence for acute pulmonary emboli. Mediastinum:  Normal cardiac size. Aorta enhances normally and is normal in caliber. No significant mediastinal, hilar or axillary lymphadenopathy. Thyroid gland shows no significant abnormalities. Esophagus shows no significant abnormalities. Lungs/pleura: There are no significant nodules or masses. No focal consolidation. There is no pleural effusion or pneumothorax. Normal tracheobronchial tree. Upper Abdomen: Limited images of the upper abdomen are unremarkable. Soft Tissues/Bones: No acute bone or soft tissue abnormality. 1. Study limited for pulmonary embolism evaluation. Within these limitations no clear evidence for acute pulmonary emboli. 2. No evidence for pneumonia. Vl Dup Lower Extremity Venous Right    Result Date: 8/3/2018  EXAMINATION: DUPLEX VENOUS ULTRASOUND OF THE RIGHT LOWER EXTREMITY, 8/3/2018 9:00 pm TECHNIQUE: Duplex ultrasound and Doppler images were obtained of the right lower extremity. COMPARISON: None. HISTORY: ORDERING SYSTEM PROVIDED HISTORY: leg swelling / healing wound / h/o dvt / off coumadin / ? clotting disorder TECHNOLOGIST PROVIDED HISTORY: Reason for exam:->leg swelling / healing wound / h/o dvt / off coumadin / ? clotting disorder FINDINGS: The visualized veins of the right lower extremity are patent and free of echogenic thrombus. The veins are normally compressible and have normal phasic flow. No evidence of DVT in the right lower extremity.      LABS:  Results for orders placed or performed during the hospital encounter of 08/03/18   CBC COURSE:   Vitals:    Vitals:    08/03/18 2007 08/03/18 2214 08/03/18 2318   BP: (!) 166/105 (!) 163/100 (!) 166/98   Pulse: 115 103 95   Resp: 16  14   Temp: 98.2 °F (36.8 °C)     TempSrc: Tympanic     SpO2: 95% 94% 96%     -------------------------  BP: (!) 166/98, Temp: 98.2 °F (36.8 °C), Pulse: 95, Resp: 14      CONSULTS:  None    PROCEDURES:  None    FINAL IMPRESSION      1. Pain of right lower extremity    2.  Tachycardia          DISPOSITION/PLAN   DISPOSITION Decision To Discharge 08/03/2018 11:24:06 PM      PATIENT REFERRED TO:  Julieta Rowley MD  [address] [place] [address]  [phone]    In 2 days        DISCHARGE MEDICATIONS:  Discharge Medication List as of 8/3/2018 11:25 PM          (Please note that portions of this note were completed with a voice recognition program.  Efforts were made to edit the dictations but occasionally words are mis-transcribed.)    Moreno Mccurdy MD, [facility]  Attending Emergency Medicine Physician        Moreno Mccurdy MD  08/04/18 0557

## 2018-08-27 ENCOUNTER — OFFICE VISIT (OUTPATIENT)
Dept: PRIMARY CARE CLINIC | Age: 53
End: 2018-08-27

## 2018-08-27 ENCOUNTER — HOSPITAL ENCOUNTER (OUTPATIENT)
Dept: LAB | Age: 53
Setting detail: SPECIMEN
Discharge: HOME | End: 2018-08-27

## 2018-08-27 VITALS
SYSTOLIC BLOOD PRESSURE: 138 MMHG | HEIGHT: 70 IN | HEART RATE: 90 BPM | WEIGHT: 239 LBS | DIASTOLIC BLOOD PRESSURE: 88 MMHG | BODY MASS INDEX: 34.22 KG/M2

## 2018-08-27 DIAGNOSIS — F19.91 HISTORY OF INTRAVENOUS DRUG USE IN REMISSION: Primary | ICD-10-CM

## 2018-08-27 DIAGNOSIS — Z11.3 SCREENING FOR STD (SEXUALLY TRANSMITTED DISEASE): ICD-10-CM

## 2018-08-27 DIAGNOSIS — F19.91 HISTORY OF INTRAVENOUS DRUG USE IN REMISSION: ICD-10-CM

## 2018-08-27 LAB
HEPATITIS C ANTIBODY: NONREACTIVE
HIV AG/AB: NONREACTIVE
T. PALLIDUM, IGG: NONREACTIVE

## 2018-08-27 PROCEDURE — 86803 HEPATITIS C AB TEST: CPT

## 2018-08-27 PROCEDURE — 99213 OFFICE O/P EST LOW 20 MIN: CPT | Performed by: NURSE PRACTITIONER

## 2018-08-27 PROCEDURE — 87389 HIV-1 AG W/HIV-1&-2 AB AG IA: CPT

## 2018-08-27 PROCEDURE — 87591 N.GONORRHOEAE DNA AMP PROB: CPT

## 2018-08-27 PROCEDURE — 36415 COLL VENOUS BLD VENIPUNCTURE: CPT

## 2018-08-27 PROCEDURE — 87491 CHLMYD TRACH DNA AMP PROBE: CPT

## 2018-08-27 PROCEDURE — 86780 TREPONEMA PALLIDUM: CPT

## 2018-08-27 ASSESSMENT — ENCOUNTER SYMPTOMS: RESPIRATORY NEGATIVE: 1

## 2018-08-27 NOTE — PATIENT INSTRUCTIONS
anyone who has symptoms of an STI, such as sores on the genitals or mouth. · Having one sex partner (who does not have STIs and does not have sex with anyone else) is a good way to avoid STIs. When should you call for help? Call your doctor now or seek immediate medical care if:    · You have new pain in your belly or pelvis.     · You have symptoms of a urinary tract infection. These may include:  ¨ Pain or burning when you urinate. ¨ A frequent need to urinate without being able to pass much urine. ¨ Pain in the flank, which is just below the rib cage and above the waist on either side of the back. ¨ Blood in your urine. ¨ A fever.     · You have new or worsening pain or swelling in the scrotum.    Watch closely for changes in your health, and be sure to contact your doctor if:    · You have unusual vaginal bleeding.     · You have a discharge from the vagina or penis.     · You have any new symptoms, such as sores, bumps, rashes, blisters, or warts.     · You have itching, tingling, pain, or burning in the genital or anal area.     · You think you may have an STI. Where can you learn more? Go to [email or web address]://[email or web address]. org and sign in to your [employer] account. Enter C096 in the [facility] box to learn more about \"Exposure to Sexually Transmitted Infections: Care Instructions. \"     If you do not have an account, please click on the \"Sign Up Now\" link. Current as of: November 27, 2017  Content Version: 11.7  © 3805-3344 [employer], [employer]. Care instructions adapted under license by [facility] ([facility]). If you have questions about a medical condition or this instruction, always ask your healthcare professional. [place] [address] any warranty or liability for your use of this information.

## 2018-08-27 NOTE — PROGRESS NOTES
Callum Montgomery  8/27/18    Chief Complaint   Patient presents with    Exposure to STD     would like tested for hep C, HIV, history of IV drug use       HPI: History IV drug use 20 years ago, now just uses marijuana occasionally.  one year ago, not currently sexually active. Presents today to be tested for STD's, HIV and Hep C. He denies any symptoms. No fevers, chills, penile discharge, or abdominal pain. He states he is doing this more to support his brother who is coming in today to check for the same things. Also admits to some depression since being  and \"empty nest\". He takes Wellbutrin for depression and it helps some. Denies any suicidal or homicidal thoughts. Past Med Hx:     Past Medical History:   Diagnosis Date    CAD (coronary artery disease)     Depression     Diabetes mellitus ([facility] [address]     Hyperlipidemia     Hypertension         Past Surgical History:   Procedure Laterality Date    SPINAL FUSION      L4 and L5    TOE AMPUTATION Right 09/04/2017       Social Hx:     Social History     Social History    Marital status:      Spouse name: N/A    Number of children: N/A    Years of education: N/A     Occupational History    Not on file. Social History Main Topics    Smoking status: Current Every Day Smoker     Packs/day: 1.00     Types: Cigarettes    Smokeless tobacco: Never Used    Alcohol use Yes      Comment: rarely    Drug use:  Yes    Sexual activity: Not on file     Other Topics Concern    Not on file     Social History Narrative    No narrative on file       No results found for: LABA1C  No results found for: EAG  Lab Results   Component Value Date    WBC 14.6 (H) 08/03/2018    HGB 16.4 08/03/2018    HCT 47.0 08/03/2018    MCV 93.0 08/03/2018     08/03/2018     Lab Results   Component Value Date     08/03/2018    K 3.8 08/03/2018    CL 96 (L) 08/03/2018    CO2 26 08/03/2018    BUN 13 08/03/2018    CREATININE 0.65 (L) 08/03/2018    GLUCOSE 201 (H) 08/03/2018    CALCIUM 9.6 08/03/2018    LABGLOM >60 08/03/2018    GFRAA >60 08/03/2018       Outpatient Encounter Prescriptions as of 8/27/2018   Medication Sig Dispense Refill    Sodium Hypochlorite 0.0125 % SOLN Apply topically as moist to dry dressing to right foot wound twice daily. 1 Bottle 1    warfarin (COUMADIN) 5 MG tablet Take 5 mg by mouth      docusate sodium (COLACE) 100 MG capsule Take 100 mg by mouth 2 times daily      enoxaparin (LOVENOX) 100 MG/ML injection Inject into the skin 2 times daily      buPROPion (WELLBUTRIN SR) 150 MG extended release tablet Take 150 mg by mouth 2 times daily      fluticasone (FLONASE) 50 MCG/ACT nasal spray 1 spray by Nasal route      acetaminophen (TYLENOL) 325 MG tablet Take 650 mg by mouth every 6 hours as needed for Pain      glyBURIDE (DIABETA) 2.5 MG tablet Take 5 mg by mouth       gabapentin (NEURONTIN) 400 MG capsule Take 400 mg by mouth every evening .  lovastatin (MEVACOR) 10 MG tablet Take 1 tablet by mouth daily       traZODone (DESYREL) 150 MG tablet Take 150 mg by mouth nightly      lisinopril (PRINIVIL;ZESTRIL) 20 MG tablet Take 20 mg by mouth daily      metFORMIN (GLUCOPHAGE) 500 MG tablet Take 1 tablet by mouth 2 times daily       ibuprofen (ADVIL;MOTRIN) 800 MG tablet Take 1 tablet by mouth every 8 hours as needed for Pain 30 tablet 0     No facility-administered encounter medications on file as of 8/27/2018. Review of Systems   Constitutional: Negative. Respiratory: Negative. Cardiovascular: Negative. Skin: Negative. Neurological: Negative. Psychiatric/Behavioral: Positive for depression. Physical Exam   Constitutional: He is oriented to person, place, and time. He appears well-developed and well-nourished. HENT:   Head: Normocephalic.    Right Ear: External ear normal.   Left Ear: External ear normal.   Nose: Nose normal.   Mouth/Throat: Oropharynx is clear and moist. Neck: Normal range of motion. No thyromegaly present. Cardiovascular: Normal rate, regular rhythm and normal heart sounds. Pulmonary/Chest: Effort normal and breath sounds normal.   Musculoskeletal: Normal range of motion. Neurological: He is alert and oriented to person, place, and time. Skin: Skin is warm. Psychiatric: He has a normal mood and affect. His speech is normal and behavior is normal. Judgment and thought content normal. Cognition and memory are normal. He expresses no homicidal and no suicidal ideation. He expresses no suicidal plans and no homicidal plans. Admits to feeling lonely due to being . He has his brothers that he hangs out with usually once weekly. Data reviewed:      ASSESSMENT:   Diagnosis Orders   1. History of intravenous drug use in remission  Hepatitis C Antibody    HIV Screen   2. Screening for STD (sexually transmitted disease)  T. Pallidum Ab    Chlamydia/GC DNA, Urine       PLAN:  Return if symptoms worsen or fail to improve. No orders of the defined types were placed in this encounter. **Make follow up with Dr. Argenis Fuentes to discuss medications. Patient given educational materials - see patient instructions. Discussed use, benefit, and side effects of prescribed medications. All patient questions answered. Pt voiced understanding. Patient agreed with treatment plan. Follow up as directed.        Electronically signed by SIMI Raphael CNP on 8/27/18 at 10:05 AM

## 2018-08-28 LAB
C. TRACHOMATIS DNA ,URINE: NEGATIVE
N. GONORRHOEAE DNA, URINE: NEGATIVE

## 2019-02-21 ENCOUNTER — APPOINTMENT (OUTPATIENT)
Dept: GENERAL RADIOLOGY | Age: 54
End: 2019-02-21

## 2019-02-21 ENCOUNTER — HOSPITAL ENCOUNTER (EMERGENCY)
Age: 54
Discharge: HOME | End: 2019-02-21
Attending: EMERGENCY MEDICINE

## 2019-02-21 VITALS
SYSTOLIC BLOOD PRESSURE: 170 MMHG | DIASTOLIC BLOOD PRESSURE: 94 MMHG | TEMPERATURE: 97.7 F | OXYGEN SATURATION: 92 % | RESPIRATION RATE: 17 BRPM | HEIGHT: 70 IN | BODY MASS INDEX: 34.22 KG/M2 | HEART RATE: 104 BPM | WEIGHT: 239 LBS

## 2019-02-21 PROCEDURE — 71046 X-RAY EXAM CHEST 2 VIEWS: CPT

## 2019-02-21 PROCEDURE — 84484 ASSAY OF TROPONIN QUANT: CPT

## 2019-02-21 PROCEDURE — 99284 EMERGENCY DEPT VISIT MOD MDM: CPT

## 2019-02-21 PROCEDURE — 85025 COMPLETE CBC W/AUTO DIFF WBC: CPT

## 2019-02-21 PROCEDURE — 93005 ELECTROCARDIOGRAM TRACING: CPT

## 2019-02-21 PROCEDURE — 80048 BASIC METABOLIC PNL TOTAL CA: CPT

## 2019-02-21 ASSESSMENT — ENCOUNTER SYMPTOMS
CONSTIPATION: 0
NAUSEA: 1
VOMITING: 1
BACK PAIN: 0
DIARRHEA: 0
ABDOMINAL PAIN: 0
SHORTNESS OF BREATH: 0

## 2019-02-21 ASSESSMENT — PAIN SCALES - GENERAL: PAINLEVEL_OUTOF10: 5

## 2019-02-21 ASSESSMENT — PAIN DESCRIPTION - PAIN TYPE: TYPE: CHRONIC PAIN

## 2019-02-22 LAB
EKG ATRIAL RATE: 86 BPM
EKG P AXIS: 63 DEGREES
EKG P-R INTERVAL: 170 MS
EKG Q-T INTERVAL: 424 MS
EKG QRS DURATION: 142 MS
EKG QTC CALCULATION (BAZETT): 507 MS
EKG R AXIS: 66 DEGREES
EKG T AXIS: 28 DEGREES
EKG VENTRICULAR RATE: 86 BPM

## 2019-02-28 ENCOUNTER — HOSPITAL ENCOUNTER (OUTPATIENT)
Dept: GENERAL RADIOLOGY | Age: 54
Discharge: HOME | End: 2019-03-02

## 2019-02-28 ENCOUNTER — HOSPITAL ENCOUNTER (OUTPATIENT)
Age: 54
Discharge: HOME | End: 2019-03-02

## 2019-02-28 DIAGNOSIS — M79.672 LEFT FOOT PAIN: ICD-10-CM

## 2019-02-28 PROCEDURE — 73630 X-RAY EXAM OF FOOT: CPT
